# Patient Record
Sex: FEMALE | Race: WHITE | Employment: OTHER | URBAN - METROPOLITAN AREA
[De-identification: names, ages, dates, MRNs, and addresses within clinical notes are randomized per-mention and may not be internally consistent; named-entity substitution may affect disease eponyms.]

---

## 2017-04-20 ENCOUNTER — HOSPITAL ENCOUNTER (OUTPATIENT)
Dept: NUCLEAR MEDICINE | Age: 82
Discharge: HOME | End: 2017-04-20
Payer: MEDICARE

## 2017-04-20 ENCOUNTER — HOSPITAL ENCOUNTER (OUTPATIENT)
Dept: NON INVASIVE DIAGNOSTICS | Age: 82
Discharge: HOME | End: 2017-04-20
Payer: MEDICARE

## 2017-04-20 VITALS
HEART RATE: 72 BPM | WEIGHT: 124 LBS | DIASTOLIC BLOOD PRESSURE: 73 MMHG | HEIGHT: 60 IN | RESPIRATION RATE: 16 BRPM | SYSTOLIC BLOOD PRESSURE: 139 MMHG | BODY MASS INDEX: 24.35 KG/M2

## 2017-04-20 LAB
LV EF: 88 %
LVEF MODALITY: NORMAL

## 2017-04-20 PROCEDURE — A9500 TC99M SESTAMIBI: HCPCS | Performed by: INTERNAL MEDICINE

## 2017-04-20 PROCEDURE — 6360000002 HC RX W HCPCS: Performed by: INTERNAL MEDICINE

## 2017-04-20 PROCEDURE — 3430000000 HC RX DIAGNOSTIC RADIOPHARMACEUTICAL: Performed by: INTERNAL MEDICINE

## 2017-04-20 PROCEDURE — 78452 HT MUSCLE IMAGE SPECT MULT: CPT

## 2017-04-20 PROCEDURE — 93017 CV STRESS TEST TRACING ONLY: CPT

## 2017-04-20 RX ORDER — 0.9 % SODIUM CHLORIDE 0.9 %
250 INTRAVENOUS SOLUTION INTRAVENOUS ONCE
Status: DISCONTINUED | OUTPATIENT
Start: 2017-04-20 | End: 2017-04-21 | Stop reason: HOSPADM

## 2017-04-20 RX ORDER — AMINOPHYLLINE DIHYDRATE 25 MG/ML
100 INJECTION, SOLUTION INTRAVENOUS
Status: ACTIVE | OUTPATIENT
Start: 2017-04-20 | End: 2017-04-20

## 2017-04-20 RX ORDER — NITROGLYCERIN 0.4 MG/1
0.4 TABLET SUBLINGUAL EVERY 5 MIN PRN
Status: DISCONTINUED | OUTPATIENT
Start: 2017-04-20 | End: 2017-04-21 | Stop reason: HOSPADM

## 2017-04-20 RX ORDER — METOPROLOL TARTRATE 5 MG/5ML
2.5 INJECTION INTRAVENOUS PRN
Status: DISCONTINUED | OUTPATIENT
Start: 2017-04-20 | End: 2017-04-21 | Stop reason: HOSPADM

## 2017-04-20 RX ORDER — SODIUM CHLORIDE 0.9 % (FLUSH) 0.9 %
10 SYRINGE (ML) INJECTION PRN
Status: DISCONTINUED | OUTPATIENT
Start: 2017-04-20 | End: 2017-04-21 | Stop reason: HOSPADM

## 2017-04-20 RX ADMIN — TETRAKIS(2-METHOXYISOBUTYLISOCYANIDE)COPPER(I) TETRAFLUOROBORATE 19.4 MILLICURIE: 1 INJECTION, POWDER, LYOPHILIZED, FOR SOLUTION INTRAVENOUS at 08:40

## 2017-04-20 RX ADMIN — TETRAKIS(2-METHOXYISOBUTYLISOCYANIDE)COPPER(I) TETRAFLUOROBORATE 42 MILLICURIE: 1 INJECTION, POWDER, LYOPHILIZED, FOR SOLUTION INTRAVENOUS at 12:00

## 2017-04-20 RX ADMIN — REGADENOSON 0.4 MG: 0.08 INJECTION, SOLUTION INTRAVENOUS at 08:38

## 2017-06-15 ENCOUNTER — HOSPITAL ENCOUNTER (OUTPATIENT)
Age: 82
Setting detail: SPECIMEN
Discharge: HOME | End: 2017-06-15
Payer: MEDICARE

## 2017-06-20 LAB — SURGICAL PATHOLOGY REPORT: NORMAL

## 2019-12-03 ENCOUNTER — HOSPITAL ENCOUNTER (OUTPATIENT)
Dept: ULTRASOUND IMAGING | Age: 84
Discharge: HOME | End: 2019-12-05
Payer: MEDICARE

## 2019-12-03 DIAGNOSIS — E04.2 NONTOXIC MULTINODULAR GOITER: ICD-10-CM

## 2019-12-03 PROCEDURE — 76536 US EXAM OF HEAD AND NECK: CPT

## 2020-07-14 ENCOUNTER — HOSPITAL ENCOUNTER (OUTPATIENT)
Dept: CT IMAGING | Facility: CLINIC | Age: 85
Discharge: HOME | End: 2020-07-16
Payer: MEDICARE

## 2020-07-14 PROCEDURE — 70450 CT HEAD/BRAIN W/O DYE: CPT

## 2023-08-09 ENCOUNTER — HOSPITAL ENCOUNTER (OUTPATIENT)
Age: 88
Discharge: HOME | End: 2023-08-09
Payer: MEDICARE

## 2023-08-09 LAB
BUN SERPL-MCNC: 15 MG/DL (ref 8–23)
CREAT SERPL-MCNC: 0.7 MG/DL (ref 0.5–0.9)
GFR SERPL CREATININE-BSD FRML MDRD: >60 ML/MIN/1.73M2

## 2023-08-09 PROCEDURE — 82565 ASSAY OF CREATININE: CPT

## 2023-08-09 PROCEDURE — 36415 COLL VENOUS BLD VENIPUNCTURE: CPT

## 2023-08-09 PROCEDURE — 84520 ASSAY OF UREA NITROGEN: CPT

## 2023-08-24 ENCOUNTER — HOSPITAL ENCOUNTER (OUTPATIENT)
Dept: CT IMAGING | Age: 88
Discharge: HOME | End: 2023-08-26
Attending: NURSE PRACTITIONER
Payer: MEDICARE

## 2023-08-24 DIAGNOSIS — C18.9 MALIGNANT NEOPLASM OF COLON, UNSPECIFIED PART OF COLON (HCC): ICD-10-CM

## 2023-08-24 LAB — CREAT BLD-MCNC: 0.7 MG/DL (ref 0.6–1.4)

## 2023-08-24 PROCEDURE — 82565 ASSAY OF CREATININE: CPT

## 2023-08-24 PROCEDURE — 2580000003 HC RX 258: Performed by: NURSE PRACTITIONER

## 2023-08-24 PROCEDURE — 6360000004 HC RX CONTRAST MEDICATION: Performed by: NURSE PRACTITIONER

## 2023-08-24 PROCEDURE — 74177 CT ABD & PELVIS W/CONTRAST: CPT

## 2023-08-24 PROCEDURE — 2500000003 HC RX 250 WO HCPCS: Performed by: NURSE PRACTITIONER

## 2023-08-24 RX ORDER — 0.9 % SODIUM CHLORIDE 0.9 %
80 INTRAVENOUS SOLUTION INTRAVENOUS ONCE
Status: COMPLETED | OUTPATIENT
Start: 2023-08-24 | End: 2023-08-24

## 2023-08-24 RX ORDER — SODIUM CHLORIDE 0.9 % (FLUSH) 0.9 %
10 SYRINGE (ML) INJECTION PRN
Status: DISCONTINUED | OUTPATIENT
Start: 2023-08-24 | End: 2023-08-27 | Stop reason: HOSPADM

## 2023-08-24 RX ADMIN — BARIUM SULFATE 450 ML: 20 SUSPENSION ORAL at 13:28

## 2023-08-24 RX ADMIN — SODIUM CHLORIDE, PRESERVATIVE FREE 10 ML: 5 INJECTION INTRAVENOUS at 13:27

## 2023-08-24 RX ADMIN — SODIUM CHLORIDE 80 ML: 9 INJECTION, SOLUTION INTRAVENOUS at 13:27

## 2023-08-24 RX ADMIN — IOPAMIDOL 100 ML: 755 INJECTION, SOLUTION INTRAVENOUS at 13:27

## 2023-08-30 ENCOUNTER — HOSPITAL ENCOUNTER (OUTPATIENT)
Age: 88
Setting detail: SPECIMEN
Discharge: HOME | End: 2023-08-30

## 2023-08-30 LAB
BASOPHILS # BLD: 0.1 K/UL (ref 0–0.2)
BASOPHILS NFR BLD: 1 % (ref 0–2)
EOSINOPHIL # BLD: 0.1 K/UL (ref 0–0.44)
EOSINOPHILS RELATIVE PERCENT: 1 % (ref 1–4)
ERYTHROCYTE [DISTWIDTH] IN BLOOD BY AUTOMATED COUNT: 23.5 % (ref 11.8–14.4)
HCT VFR BLD AUTO: 38.7 % (ref 36.3–47.1)
HGB BLD-MCNC: 11.4 G/DL (ref 11.9–15.1)
IMM GRANULOCYTES # BLD AUTO: 0.1 K/UL (ref 0–0.3)
IMM GRANULOCYTES NFR BLD: 1 %
LYMPHOCYTES # BLD: 16 % (ref 24–43)
LYMPHOCYTES NFR BLD: 1.63 K/UL (ref 1.1–3.7)
MCH RBC QN AUTO: 23.5 PG (ref 25.2–33.5)
MCHC RBC AUTO-ENTMCNC: 29.5 G/DL (ref 28.4–34.8)
MCV RBC AUTO: 79.6 FL (ref 82.6–102.9)
MONOCYTES NFR BLD: 0.92 K/UL (ref 0.1–1.2)
MONOCYTES NFR BLD: 9 % (ref 3–12)
MORPHOLOGY: ABNORMAL
NEUTROPHILS NFR BLD: 72 % (ref 36–65)
NEUTS SEG NFR BLD: 7.35 K/UL (ref 1.5–8.1)
NRBC BLD-RTO: 0 PER 100 WBC
PLATELET # BLD AUTO: 477 K/UL (ref 138–453)
PMV BLD AUTO: 11 FL (ref 8.1–13.5)
RBC # BLD AUTO: 4.86 M/UL (ref 3.95–5.11)
WBC OTHER # BLD: 10.2 K/UL (ref 3.5–11.3)

## 2023-08-30 PROCEDURE — P9603 ONE-WAY ALLOW PRORATED MILES: HCPCS

## 2023-08-30 PROCEDURE — 36415 COLL VENOUS BLD VENIPUNCTURE: CPT

## 2023-08-30 PROCEDURE — 85025 COMPLETE CBC W/AUTO DIFF WBC: CPT

## 2023-09-20 ENCOUNTER — HOSPITAL ENCOUNTER (OUTPATIENT)
Dept: PREADMISSION TESTING | Age: 88
Discharge: HOME | End: 2023-09-20
Payer: MEDICARE

## 2023-09-20 VITALS
OXYGEN SATURATION: 98 % | DIASTOLIC BLOOD PRESSURE: 57 MMHG | TEMPERATURE: 97.7 F | RESPIRATION RATE: 16 BRPM | SYSTOLIC BLOOD PRESSURE: 118 MMHG | BODY MASS INDEX: 24.74 KG/M2 | HEART RATE: 65 BPM | HEIGHT: 60 IN | WEIGHT: 126 LBS

## 2023-09-20 LAB
ABO + RH BLD: NORMAL
ANION GAP SERPL CALCULATED.3IONS-SCNC: 9 MMOL/L (ref 9–17)
ARM BAND NUMBER: NORMAL
BLOOD BANK SAMPLE EXPIRATION: NORMAL
BLOOD GROUP ANTIBODIES SERPL: NEGATIVE
BUN SERPL-MCNC: 16 MG/DL (ref 8–23)
BUN/CREAT SERPL: 18 (ref 9–20)
CALCIUM SERPL-MCNC: 9.7 MG/DL (ref 8.6–10.4)
CEA SERPL-MCNC: 12.7 NG/ML
CHLORIDE SERPL-SCNC: 102 MMOL/L (ref 98–107)
CO2 SERPL-SCNC: 27 MMOL/L (ref 20–31)
CREAT SERPL-MCNC: 0.9 MG/DL (ref 0.5–0.9)
ERYTHROCYTE [DISTWIDTH] IN BLOOD BY AUTOMATED COUNT: 27.4 % (ref 11.8–14.4)
EST. AVERAGE GLUCOSE BLD GHB EST-MCNC: 131 MG/DL
GFR SERPL CREATININE-BSD FRML MDRD: >60 ML/MIN/1.73M2
GLUCOSE SERPL-MCNC: 143 MG/DL (ref 70–99)
HBA1C MFR BLD: 6.2 % (ref 4–6)
HCT VFR BLD AUTO: 33.9 % (ref 36.3–47.1)
HGB BLD-MCNC: 10.1 G/DL (ref 11.9–15.1)
MCH RBC QN AUTO: 25.1 PG (ref 25.2–33.5)
MCHC RBC AUTO-ENTMCNC: 29.8 G/DL (ref 28.4–34.8)
MCV RBC AUTO: 84.1 FL (ref 82.6–102.9)
NRBC BLD-RTO: 0 PER 100 WBC
PLATELET # BLD AUTO: 256 K/UL (ref 138–453)
PMV BLD AUTO: 10.3 FL (ref 8.1–13.5)
POTASSIUM SERPL-SCNC: 4.3 MMOL/L (ref 3.7–5.3)
RBC # BLD AUTO: 4.03 M/UL (ref 3.95–5.11)
SODIUM SERPL-SCNC: 138 MMOL/L (ref 135–144)
WBC OTHER # BLD: 9.6 K/UL (ref 3.5–11.3)

## 2023-09-20 PROCEDURE — 80048 BASIC METABOLIC PNL TOTAL CA: CPT

## 2023-09-20 PROCEDURE — 36415 COLL VENOUS BLD VENIPUNCTURE: CPT

## 2023-09-20 PROCEDURE — 83036 HEMOGLOBIN GLYCOSYLATED A1C: CPT

## 2023-09-20 PROCEDURE — 86850 RBC ANTIBODY SCREEN: CPT

## 2023-09-20 PROCEDURE — 85027 COMPLETE CBC AUTOMATED: CPT

## 2023-09-20 PROCEDURE — 86900 BLOOD TYPING SEROLOGIC ABO: CPT

## 2023-09-20 PROCEDURE — 82378 CARCINOEMBRYONIC ANTIGEN: CPT

## 2023-09-20 PROCEDURE — 86901 BLOOD TYPING SEROLOGIC RH(D): CPT

## 2023-09-20 RX ORDER — TRAMADOL HYDROCHLORIDE 50 MG/1
TABLET ORAL
COMMUNITY
Start: 2023-06-28

## 2023-09-20 RX ORDER — FUROSEMIDE 40 MG/1
40 TABLET ORAL 2 TIMES DAILY
COMMUNITY

## 2023-09-20 RX ORDER — AMLODIPINE BESYLATE 5 MG/1
5 TABLET ORAL DAILY
COMMUNITY

## 2023-09-20 RX ORDER — ATORVASTATIN CALCIUM 10 MG/1
10 TABLET, FILM COATED ORAL DAILY
COMMUNITY

## 2023-09-20 RX ORDER — DENOSUMAB 60 MG/ML
60 INJECTION SUBCUTANEOUS ONCE
COMMUNITY

## 2023-09-20 RX ORDER — VITAMIN B COMPLEX
1 TABLET ORAL DAILY
COMMUNITY

## 2023-09-20 RX ORDER — METRONIDAZOLE 500 MG/100ML
500 INJECTION, SOLUTION INTRAVENOUS EVERY 8 HOURS
OUTPATIENT
Start: 2023-10-04

## 2023-09-20 RX ORDER — LISINOPRIL 20 MG/1
20 TABLET ORAL 2 TIMES DAILY
COMMUNITY

## 2023-09-20 ASSESSMENT — PAIN SCALES - GENERAL: PAINLEVEL_OUTOF10: 0

## 2023-10-03 ENCOUNTER — ANESTHESIA EVENT (OUTPATIENT)
Dept: OPERATING ROOM | Age: 88
End: 2023-10-03
Payer: MEDICARE

## 2023-10-04 ENCOUNTER — HOSPITAL ENCOUNTER (INPATIENT)
Age: 88
LOS: 5 days | Discharge: NURSING, REHAB OR LONG-TERM CARE | DRG: 331 | End: 2023-10-09
Attending: SURGERY | Admitting: SURGERY
Payer: MEDICARE

## 2023-10-04 ENCOUNTER — ANESTHESIA (OUTPATIENT)
Dept: OPERATING ROOM | Age: 88
End: 2023-10-04
Payer: MEDICARE

## 2023-10-04 DIAGNOSIS — G89.18 ACUTE POSTOPERATIVE PAIN: Primary | ICD-10-CM

## 2023-10-04 DIAGNOSIS — K63.89 COLONIC MASS: ICD-10-CM

## 2023-10-04 LAB
ABO + RH BLD: NORMAL
ARM BAND NUMBER: NORMAL
BLOOD BANK SAMPLE EXPIRATION: NORMAL
BLOOD GROUP ANTIBODIES SERPL: NEGATIVE
GLUCOSE BLD-MCNC: 126 MG/DL (ref 65–105)
GLUCOSE BLD-MCNC: 137 MG/DL (ref 65–105)

## 2023-10-04 PROCEDURE — 6370000000 HC RX 637 (ALT 250 FOR IP): Performed by: HOSPITALIST

## 2023-10-04 PROCEDURE — 3600000002 HC SURGERY LEVEL 2 BASE: Performed by: SURGERY

## 2023-10-04 PROCEDURE — 0DRU0JZ REPLACEMENT OF OMENTUM WITH SYNTHETIC SUBSTITUTE, OPEN APPROACH: ICD-10-PCS | Performed by: SURGERY

## 2023-10-04 PROCEDURE — 2709999900 HC NON-CHARGEABLE SUPPLY: Performed by: SURGERY

## 2023-10-04 PROCEDURE — A4216 STERILE WATER/SALINE, 10 ML: HCPCS | Performed by: SURGERY

## 2023-10-04 PROCEDURE — 86901 BLOOD TYPING SEROLOGIC RH(D): CPT

## 2023-10-04 PROCEDURE — 86900 BLOOD TYPING SEROLOGIC ABO: CPT

## 2023-10-04 PROCEDURE — 3E0T3BZ INTRODUCTION OF ANESTHETIC AGENT INTO PERIPHERAL NERVES AND PLEXI, PERCUTANEOUS APPROACH: ICD-10-PCS

## 2023-10-04 PROCEDURE — 0DTF0ZZ RESECTION OF RIGHT LARGE INTESTINE, OPEN APPROACH: ICD-10-PCS | Performed by: SURGERY

## 2023-10-04 PROCEDURE — 2720000010 HC SURG SUPPLY STERILE: Performed by: SURGERY

## 2023-10-04 PROCEDURE — 7100000001 HC PACU RECOVERY - ADDTL 15 MIN: Performed by: SURGERY

## 2023-10-04 PROCEDURE — 6360000002 HC RX W HCPCS: Performed by: SURGERY

## 2023-10-04 PROCEDURE — 88342 IMHCHEM/IMCYTCHM 1ST ANTB: CPT

## 2023-10-04 PROCEDURE — 3700000001 HC ADD 15 MINUTES (ANESTHESIA): Performed by: SURGERY

## 2023-10-04 PROCEDURE — 36415 COLL VENOUS BLD VENIPUNCTURE: CPT

## 2023-10-04 PROCEDURE — 6360000002 HC RX W HCPCS: Performed by: ANESTHESIOLOGY

## 2023-10-04 PROCEDURE — 2060000000 HC ICU INTERMEDIATE R&B

## 2023-10-04 PROCEDURE — 3600000012 HC SURGERY LEVEL 2 ADDTL 15MIN: Performed by: SURGERY

## 2023-10-04 PROCEDURE — 6360000002 HC RX W HCPCS: Performed by: NURSE ANESTHETIST, CERTIFIED REGISTERED

## 2023-10-04 PROCEDURE — 3700000000 HC ANESTHESIA ATTENDED CARE: Performed by: SURGERY

## 2023-10-04 PROCEDURE — 88341 IMHCHEM/IMCYTCHM EA ADD ANTB: CPT

## 2023-10-04 PROCEDURE — 2580000003 HC RX 258: Performed by: ANESTHESIOLOGY

## 2023-10-04 PROCEDURE — 82947 ASSAY GLUCOSE BLOOD QUANT: CPT

## 2023-10-04 PROCEDURE — 7100000000 HC PACU RECOVERY - FIRST 15 MIN: Performed by: SURGERY

## 2023-10-04 PROCEDURE — 2500000003 HC RX 250 WO HCPCS: Performed by: SURGERY

## 2023-10-04 PROCEDURE — 88307 TISSUE EXAM BY PATHOLOGIST: CPT

## 2023-10-04 PROCEDURE — 86850 RBC ANTIBODY SCREEN: CPT

## 2023-10-04 PROCEDURE — 2580000003 HC RX 258: Performed by: SURGERY

## 2023-10-04 PROCEDURE — 1200000000 HC SEMI PRIVATE

## 2023-10-04 PROCEDURE — 0DTF0ZZ RESECTION OF RIGHT LARGE INTESTINE, OPEN APPROACH: ICD-10-PCS

## 2023-10-04 PROCEDURE — 2500000003 HC RX 250 WO HCPCS: Performed by: NURSE ANESTHETIST, CERTIFIED REGISTERED

## 2023-10-04 PROCEDURE — P9041 ALBUMIN (HUMAN),5%, 50ML: HCPCS | Performed by: NURSE ANESTHETIST, CERTIFIED REGISTERED

## 2023-10-04 RX ORDER — PHENYLEPHRINE HCL IN 0.9% NACL 1 MG/10 ML
SYRINGE (ML) INTRAVENOUS PRN
Status: DISCONTINUED | OUTPATIENT
Start: 2023-10-04 | End: 2023-10-04 | Stop reason: SDUPTHER

## 2023-10-04 RX ORDER — POTASSIUM CHLORIDE 20 MEQ/1
40 TABLET, EXTENDED RELEASE ORAL PRN
Status: DISCONTINUED | OUTPATIENT
Start: 2023-10-04 | End: 2023-10-09 | Stop reason: HOSPADM

## 2023-10-04 RX ORDER — ATORVASTATIN CALCIUM 10 MG/1
10 TABLET, FILM COATED ORAL DAILY
Status: DISCONTINUED | OUTPATIENT
Start: 2023-10-04 | End: 2023-10-09 | Stop reason: HOSPADM

## 2023-10-04 RX ORDER — MORPHINE SULFATE 2 MG/ML
2 INJECTION, SOLUTION INTRAMUSCULAR; INTRAVENOUS
Status: DISCONTINUED | OUTPATIENT
Start: 2023-10-04 | End: 2023-10-07

## 2023-10-04 RX ORDER — LIDOCAINE HYDROCHLORIDE 10 MG/ML
1 INJECTION, SOLUTION EPIDURAL; INFILTRATION; INTRACAUDAL; PERINEURAL
Status: DISCONTINUED | OUTPATIENT
Start: 2023-10-05 | End: 2023-10-04 | Stop reason: HOSPADM

## 2023-10-04 RX ORDER — MORPHINE SULFATE 2 MG/ML
2 INJECTION, SOLUTION INTRAMUSCULAR; INTRAVENOUS EVERY 4 HOURS PRN
Status: DISCONTINUED | OUTPATIENT
Start: 2023-10-04 | End: 2023-10-07

## 2023-10-04 RX ORDER — FENTANYL CITRATE 50 UG/ML
25 INJECTION, SOLUTION INTRAMUSCULAR; INTRAVENOUS EVERY 5 MIN PRN
Status: DISCONTINUED | OUTPATIENT
Start: 2023-10-04 | End: 2023-10-04 | Stop reason: HOSPADM

## 2023-10-04 RX ORDER — SODIUM CHLORIDE 9 MG/ML
INJECTION, SOLUTION INTRAVENOUS CONTINUOUS
Status: DISCONTINUED | OUTPATIENT
Start: 2023-10-04 | End: 2023-10-04

## 2023-10-04 RX ORDER — FAMOTIDINE 20 MG/1
20 TABLET, FILM COATED ORAL DAILY
Status: DISCONTINUED | OUTPATIENT
Start: 2023-10-04 | End: 2023-10-09 | Stop reason: HOSPADM

## 2023-10-04 RX ORDER — BUPIVACAINE HYDROCHLORIDE AND EPINEPHRINE 5; 5 MG/ML; UG/ML
INJECTION, SOLUTION EPIDURAL; INTRACAUDAL; PERINEURAL PRN
Status: DISCONTINUED | OUTPATIENT
Start: 2023-10-04 | End: 2023-10-04 | Stop reason: ALTCHOICE

## 2023-10-04 RX ORDER — DEXAMETHASONE SODIUM PHOSPHATE 10 MG/ML
INJECTION, SOLUTION INTRAMUSCULAR; INTRAVENOUS PRN
Status: DISCONTINUED | OUTPATIENT
Start: 2023-10-04 | End: 2023-10-04 | Stop reason: SDUPTHER

## 2023-10-04 RX ORDER — DEXTROSE AND SODIUM CHLORIDE 5; .45 G/100ML; G/100ML
INJECTION, SOLUTION INTRAVENOUS CONTINUOUS
Status: DISCONTINUED | OUTPATIENT
Start: 2023-10-04 | End: 2023-10-07

## 2023-10-04 RX ORDER — FUROSEMIDE 40 MG/1
40 TABLET ORAL 2 TIMES DAILY
Status: DISCONTINUED | OUTPATIENT
Start: 2023-10-04 | End: 2023-10-06

## 2023-10-04 RX ORDER — PROPOFOL 10 MG/ML
INJECTION, EMULSION INTRAVENOUS PRN
Status: DISCONTINUED | OUTPATIENT
Start: 2023-10-04 | End: 2023-10-04 | Stop reason: SDUPTHER

## 2023-10-04 RX ORDER — SODIUM CHLORIDE 0.9 % (FLUSH) 0.9 %
5-40 SYRINGE (ML) INJECTION PRN
Status: DISCONTINUED | OUTPATIENT
Start: 2023-10-04 | End: 2023-10-09 | Stop reason: HOSPADM

## 2023-10-04 RX ORDER — LIDOCAINE HYDROCHLORIDE 20 MG/ML
INJECTION, SOLUTION EPIDURAL; INFILTRATION; INTRACAUDAL; PERINEURAL PRN
Status: DISCONTINUED | OUTPATIENT
Start: 2023-10-04 | End: 2023-10-04 | Stop reason: SDUPTHER

## 2023-10-04 RX ORDER — MAGNESIUM SULFATE IN WATER 40 MG/ML
2000 INJECTION, SOLUTION INTRAVENOUS PRN
Status: DISCONTINUED | OUTPATIENT
Start: 2023-10-04 | End: 2023-10-09 | Stop reason: HOSPADM

## 2023-10-04 RX ORDER — SODIUM CHLORIDE, SODIUM LACTATE, POTASSIUM CHLORIDE, CALCIUM CHLORIDE 600; 310; 30; 20 MG/100ML; MG/100ML; MG/100ML; MG/100ML
INJECTION, SOLUTION INTRAVENOUS CONTINUOUS
Status: DISCONTINUED | OUTPATIENT
Start: 2023-10-04 | End: 2023-10-04

## 2023-10-04 RX ORDER — SODIUM CHLORIDE 0.9 % (FLUSH) 0.9 %
5-40 SYRINGE (ML) INJECTION EVERY 12 HOURS SCHEDULED
Status: DISCONTINUED | OUTPATIENT
Start: 2023-10-04 | End: 2023-10-09 | Stop reason: HOSPADM

## 2023-10-04 RX ORDER — ONDANSETRON 2 MG/ML
4 INJECTION INTRAMUSCULAR; INTRAVENOUS
Status: DISCONTINUED | OUTPATIENT
Start: 2023-10-04 | End: 2023-10-04 | Stop reason: HOSPADM

## 2023-10-04 RX ORDER — FENTANYL CITRATE 50 UG/ML
50 INJECTION, SOLUTION INTRAMUSCULAR; INTRAVENOUS EVERY 10 MIN PRN
Status: DISCONTINUED | OUTPATIENT
Start: 2023-10-04 | End: 2023-10-04 | Stop reason: HOSPADM

## 2023-10-04 RX ORDER — OXYCODONE HYDROCHLORIDE 5 MG/1
2.5 TABLET ORAL EVERY 6 HOURS PRN
Status: DISCONTINUED | OUTPATIENT
Start: 2023-10-04 | End: 2023-10-07

## 2023-10-04 RX ORDER — SODIUM CHLORIDE 0.9 % (FLUSH) 0.9 %
5-40 SYRINGE (ML) INJECTION PRN
Status: DISCONTINUED | OUTPATIENT
Start: 2023-10-04 | End: 2023-10-04 | Stop reason: HOSPADM

## 2023-10-04 RX ORDER — ENOXAPARIN SODIUM 100 MG/ML
30 INJECTION SUBCUTANEOUS DAILY
Status: DISCONTINUED | OUTPATIENT
Start: 2023-10-05 | End: 2023-10-08

## 2023-10-04 RX ORDER — SODIUM CHLORIDE 9 MG/ML
INJECTION, SOLUTION INTRAVENOUS PRN
Status: DISCONTINUED | OUTPATIENT
Start: 2023-10-04 | End: 2023-10-04 | Stop reason: HOSPADM

## 2023-10-04 RX ORDER — ALBUMIN, HUMAN INJ 5% 5 %
SOLUTION INTRAVENOUS PRN
Status: DISCONTINUED | OUTPATIENT
Start: 2023-10-04 | End: 2023-10-04 | Stop reason: SDUPTHER

## 2023-10-04 RX ORDER — POTASSIUM CHLORIDE 20 MEQ/1
40 TABLET, EXTENDED RELEASE ORAL PRN
Status: DISCONTINUED | OUTPATIENT
Start: 2023-10-04 | End: 2023-10-04 | Stop reason: SDUPTHER

## 2023-10-04 RX ORDER — ROCURONIUM BROMIDE 10 MG/ML
INJECTION, SOLUTION INTRAVENOUS PRN
Status: DISCONTINUED | OUTPATIENT
Start: 2023-10-04 | End: 2023-10-04 | Stop reason: SDUPTHER

## 2023-10-04 RX ORDER — POTASSIUM CHLORIDE 7.45 MG/ML
10 INJECTION INTRAVENOUS PRN
Status: DISCONTINUED | OUTPATIENT
Start: 2023-10-04 | End: 2023-10-04 | Stop reason: SDUPTHER

## 2023-10-04 RX ORDER — ONDANSETRON 2 MG/ML
4 INJECTION INTRAMUSCULAR; INTRAVENOUS EVERY 6 HOURS PRN
Status: DISCONTINUED | OUTPATIENT
Start: 2023-10-04 | End: 2023-10-09 | Stop reason: HOSPADM

## 2023-10-04 RX ORDER — METRONIDAZOLE 500 MG/100ML
500 INJECTION, SOLUTION INTRAVENOUS ONCE
Status: COMPLETED | OUTPATIENT
Start: 2023-10-04 | End: 2023-10-04

## 2023-10-04 RX ORDER — SODIUM CHLORIDE 0.9 % (FLUSH) 0.9 %
5-40 SYRINGE (ML) INJECTION EVERY 12 HOURS SCHEDULED
Status: DISCONTINUED | OUTPATIENT
Start: 2023-10-04 | End: 2023-10-04 | Stop reason: HOSPADM

## 2023-10-04 RX ORDER — LISINOPRIL 20 MG/1
20 TABLET ORAL 2 TIMES DAILY
Status: DISCONTINUED | OUTPATIENT
Start: 2023-10-04 | End: 2023-10-09 | Stop reason: HOSPADM

## 2023-10-04 RX ORDER — SODIUM CHLORIDE 9 MG/ML
INJECTION, SOLUTION INTRAVENOUS PRN
Status: DISCONTINUED | OUTPATIENT
Start: 2023-10-04 | End: 2023-10-09 | Stop reason: HOSPADM

## 2023-10-04 RX ORDER — AMLODIPINE BESYLATE 5 MG/1
5 TABLET ORAL DAILY
Status: DISCONTINUED | OUTPATIENT
Start: 2023-10-04 | End: 2023-10-09 | Stop reason: HOSPADM

## 2023-10-04 RX ORDER — FENTANYL CITRATE 50 UG/ML
INJECTION, SOLUTION INTRAMUSCULAR; INTRAVENOUS PRN
Status: DISCONTINUED | OUTPATIENT
Start: 2023-10-04 | End: 2023-10-04 | Stop reason: SDUPTHER

## 2023-10-04 RX ORDER — POTASSIUM CHLORIDE 7.45 MG/ML
10 INJECTION INTRAVENOUS PRN
Status: DISCONTINUED | OUTPATIENT
Start: 2023-10-04 | End: 2023-10-09 | Stop reason: HOSPADM

## 2023-10-04 RX ORDER — ONDANSETRON 2 MG/ML
INJECTION INTRAMUSCULAR; INTRAVENOUS PRN
Status: DISCONTINUED | OUTPATIENT
Start: 2023-10-04 | End: 2023-10-04 | Stop reason: SDUPTHER

## 2023-10-04 RX ORDER — OXYCODONE HYDROCHLORIDE 5 MG/1
5 TABLET ORAL
Status: DISCONTINUED | OUTPATIENT
Start: 2023-10-04 | End: 2023-10-04 | Stop reason: HOSPADM

## 2023-10-04 RX ADMIN — LISINOPRIL 20 MG: 20 TABLET ORAL at 20:18

## 2023-10-04 RX ADMIN — PROPOFOL 100 MG: 10 INJECTION, EMULSION INTRAVENOUS at 07:35

## 2023-10-04 RX ADMIN — LIDOCAINE HYDROCHLORIDE 50 MG: 20 INJECTION, SOLUTION EPIDURAL; INFILTRATION; INTRACAUDAL; PERINEURAL at 07:35

## 2023-10-04 RX ADMIN — METOPROLOL TARTRATE 25 MG: 25 TABLET, FILM COATED ORAL at 20:18

## 2023-10-04 RX ADMIN — ROCURONIUM BROMIDE 50 MG: 10 INJECTION, SOLUTION INTRAVENOUS at 07:35

## 2023-10-04 RX ADMIN — METOPROLOL TARTRATE 25 MG: 25 TABLET, FILM COATED ORAL at 15:06

## 2023-10-04 RX ADMIN — Medication 200 MCG: at 08:15

## 2023-10-04 RX ADMIN — METRONIDAZOLE 500 MG: 500 SOLUTION INTRAVENOUS at 07:44

## 2023-10-04 RX ADMIN — MORPHINE SULFATE 2 MG: 2 INJECTION, SOLUTION INTRAMUSCULAR; INTRAVENOUS at 15:07

## 2023-10-04 RX ADMIN — FENTANYL CITRATE 25 MCG: 50 INJECTION INTRAMUSCULAR; INTRAVENOUS at 09:50

## 2023-10-04 RX ADMIN — DEXAMETHASONE SODIUM PHOSPHATE 4 MG: 10 INJECTION, SOLUTION INTRAMUSCULAR; INTRAVENOUS at 07:52

## 2023-10-04 RX ADMIN — FAMOTIDINE 20 MG: 10 INJECTION, SOLUTION INTRAVENOUS at 20:18

## 2023-10-04 RX ADMIN — Medication 100 MCG: at 07:45

## 2023-10-04 RX ADMIN — Medication 100 MCG: at 07:41

## 2023-10-04 RX ADMIN — AMLODIPINE BESYLATE 5 MG: 5 TABLET ORAL at 15:06

## 2023-10-04 RX ADMIN — LISINOPRIL 20 MG: 20 TABLET ORAL at 15:06

## 2023-10-04 RX ADMIN — Medication 100 MCG: at 08:07

## 2023-10-04 RX ADMIN — DEXTROSE AND SODIUM CHLORIDE: 5; 450 INJECTION, SOLUTION INTRAVENOUS at 13:09

## 2023-10-04 RX ADMIN — SUGAMMADEX 200 MG: 100 INJECTION, SOLUTION INTRAVENOUS at 08:50

## 2023-10-04 RX ADMIN — MORPHINE SULFATE 2 MG: 2 INJECTION, SOLUTION INTRAMUSCULAR; INTRAVENOUS at 20:22

## 2023-10-04 RX ADMIN — SODIUM CHLORIDE, PRESERVATIVE FREE 10 ML: 5 INJECTION INTRAVENOUS at 20:24

## 2023-10-04 RX ADMIN — SODIUM CHLORIDE, POTASSIUM CHLORIDE, SODIUM LACTATE AND CALCIUM CHLORIDE: 600; 310; 30; 20 INJECTION, SOLUTION INTRAVENOUS at 07:29

## 2023-10-04 RX ADMIN — ONDANSETRON 4 MG: 2 INJECTION INTRAMUSCULAR; INTRAVENOUS at 08:29

## 2023-10-04 RX ADMIN — FENTANYL CITRATE 50 MCG: 50 INJECTION INTRAMUSCULAR; INTRAVENOUS at 07:35

## 2023-10-04 RX ADMIN — Medication 2000 MG: at 06:56

## 2023-10-04 RX ADMIN — MORPHINE SULFATE 2 MG: 2 INJECTION, SOLUTION INTRAMUSCULAR; INTRAVENOUS at 23:01

## 2023-10-04 RX ADMIN — FUROSEMIDE 40 MG: 40 TABLET ORAL at 15:06

## 2023-10-04 RX ADMIN — Medication 100 MCG: at 08:10

## 2023-10-04 RX ADMIN — Medication 100 MCG: at 07:52

## 2023-10-04 RX ADMIN — Medication 100 MCG: at 07:36

## 2023-10-04 RX ADMIN — FENTANYL CITRATE 25 MCG: 50 INJECTION INTRAMUSCULAR; INTRAVENOUS at 11:48

## 2023-10-04 RX ADMIN — FENTANYL CITRATE 25 MCG: 50 INJECTION INTRAMUSCULAR; INTRAVENOUS at 10:27

## 2023-10-04 RX ADMIN — ALBUMIN (HUMAN) 25 G: 12.5 INJECTION, SOLUTION INTRAVENOUS at 07:46

## 2023-10-04 RX ADMIN — ATORVASTATIN CALCIUM 10 MG: 10 TABLET, FILM COATED ORAL at 15:06

## 2023-10-04 RX ADMIN — SODIUM CHLORIDE, POTASSIUM CHLORIDE, SODIUM LACTATE AND CALCIUM CHLORIDE: 600; 310; 30; 20 INJECTION, SOLUTION INTRAVENOUS at 06:53

## 2023-10-04 ASSESSMENT — PAIN DESCRIPTION - ORIENTATION
ORIENTATION: MID
ORIENTATION: MID

## 2023-10-04 ASSESSMENT — PAIN DESCRIPTION - DESCRIPTORS
DESCRIPTORS: ACHING
DESCRIPTORS: ACHING;SORE

## 2023-10-04 ASSESSMENT — PAIN DESCRIPTION - PAIN TYPE: TYPE: SURGICAL PAIN;CHRONIC PAIN

## 2023-10-04 ASSESSMENT — PAIN DESCRIPTION - LOCATION
LOCATION: ABDOMEN
LOCATION: ABDOMEN;NECK
LOCATION: ABDOMEN

## 2023-10-04 ASSESSMENT — PAIN SCALES - GENERAL
PAINLEVEL_OUTOF10: 7
PAINLEVEL_OUTOF10: 4
PAINLEVEL_OUTOF10: 5
PAINLEVEL_OUTOF10: 5
PAINLEVEL_OUTOF10: 7
PAINLEVEL_OUTOF10: 7
PAINLEVEL_OUTOF10: 5

## 2023-10-04 ASSESSMENT — PAIN - FUNCTIONAL ASSESSMENT: PAIN_FUNCTIONAL_ASSESSMENT: 0-10

## 2023-10-04 ASSESSMENT — ENCOUNTER SYMPTOMS: SHORTNESS OF BREATH: 0

## 2023-10-04 NOTE — OP NOTE
Operative Note      Patient: Sam Ramsey  YOB: 1935  MRN: [id number]    Date of Procedure: 10/4/2023    Pre-Op Diagnosis Codes:     * Colonic mass [K63.89]    Post-Op Diagnosis:  Ascending colon mass, intra-abdominal lesion       Procedure(s):  OPEN RIGHT HEMICOLECTOMY WITH MOBILIZATION OF HEPATIC FLEXURE, LYSIS OF ADHESIONS, TAP BLOCK, omentopexy    Surgeon(s):  Yessica Perez MD    Assistant:   First Assistant: Cassandra Lauren    Anesthesia: General    Estimated Blood Loss (mL): 25    Complications: None    Specimens:   ID Type Source Tests Collected by Time Destination   A : RIGHT COLON Tissue Colon SURGICAL PATHOLOGY Yessica Perez MD 10/4/2023 0818        Implants:  * No implants in log *      Drains: * No LDAs found *    Findings: Ascending colon    Colon Resection  Operation performed with curative intent Yes   Tumor Location (select all that apply) Ascending colon   Extent of colon and vascular resection (select all that apply) Right hemicolectomy - ileocolic, right colic (if present)          Detailed Description of Procedure:   Patient was brought to the operative room and was placed in the supine position. After induction of general endotracheal incision, patient's abdomen was prepped and draped in usual sterile fashion. A midline incision was made starting from the mid abdomen to just above the umbilicus. Incision was taken down to the fascia which was opened. The fascia was opened full length. The mass in question was noted in the mid ascending colon. Incision was extended cephalad. After opening the incision, a wound protector was placed. Abdominal adhesions in the lower abdomen were encountered where the small bowel was adhered to the pelvis. These were carefully freed following this, examination revealed no liver lesions. No evidence of any metastatic disease noted to the area of abnormality was noted to be in the mid ascending colon. A annular mass could be palpated.   Next by using

## 2023-10-04 NOTE — PROGRESS NOTES
Physical Therapy  DATE: 10/4/2023    NAME: Doroteo Major  MRN: [id number]   : 1935    Patient not seen this date for Physical Therapy due to:      [] Cancel by RN or physician due to:    [] Hemodialysis    [] Critical Lab Value Level     [] Blood transfusion in progress    [] Acute or unstable cardiovascular status   _MAP < 55 or more than >115  _HR < 40 or > 130    [] Acute or unstable pulmonary status   -FiO2 > 60%   _RR < 5 or >40    _O2 sats < 85%    [] Strict Bedrest    [x] Off Unit for surgery for open R hemicolectomy, N/A for PT eval until 10/5    [] Off Unit for testing       [] Pending imaging to R/O fracture    [] Refusal by Patient      [] Other      [] PT being discontinued at this time. Patient independent. No further needs. [] PT being discontinued at this time as the patient has been transferred to hospice care. No further needs.       [address], PT

## 2023-10-04 NOTE — ANESTHESIA POSTPROCEDURE EVALUATION
Department of Anesthesiology  Postprocedure Note    Patient: Maggi Melton  MRN: [id number]  YOB: 1935  Date of evaluation: 10/4/2023      Procedure Summary     Date: 10/04/23 Room / Location: [address] - INPATIENT    Anesthesia Start: 0725 Anesthesia Stop: 3227    Procedure: OPEN RIGHT HEMICOLECTOMY WITH MOBILIZATION OF HEPATIC FLEXURE, LYSIS OF ADHESIONS, TAP BLOCK (Right: Abdomen) Diagnosis:       Colonic mass      (Colonic mass [K63.89])    Surgeons: John Graves MD Responsible Provider: Parisa Baugh MD    Anesthesia Type: general ASA Status: 3          Anesthesia Type: No value filed.     Lizzy Phase I: Lizzy Score: 9    Lizzy Phase II:        Anesthesia Post Evaluation    Complications: no

## 2023-10-04 NOTE — CONSULTS
hypertension, hyperlipidemia, osteoporosis. Denies any previous cardiac problem, vascular surgery. She has have osteoporosis and osteoarthritis. Patient is a former smoker and quit smoking in 1989. Son was present at the bedside. They confirmed that patient does not want any chest compression, CPR, cardioversion, intubation. I have personally reviewed the past medical history, past surgical history, medications, social history, and family history, and summarized in the note. Review of Systems:     All 10 point system is reviewed and negative otherwise mentioned in HPI. 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    DDD (degenerative disc disease), lumbar     Diabetes mellitus ([address])     no medication    History of blood transfusion     Hyperlipidemia     Hypertension     Scoliosis     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    TIA (transient ischemic attack)     over 3 years ago from 2023    [facility] as ambulation aid         Past Surgical History:     Past Surgical History:   Procedure Laterality Date    ARM SURGERY Right     CATARACT REMOVAL      CHOLECYSTECTOMY      EYE SURGERY      FEMUR FRACTURE SURGERY Left     HIP FRACTURE SURGERY Right     JOINT REPLACEMENT Bilateral     knees    TONSILLECTOMY          Medications Prior to Admission:       Prior to Admission medications    Medication Sig Start Date End Date Taking?  Authorizing Provider   lisinopril (PRINIVIL;ZESTRIL) 20 MG tablet Take 1 tablet by mouth in the morning and at bedtime    Libra Mata MD   metoprolol tartrate (LOPRESSOR) 25 MG tablet Take 1 tablet by mouth 2 times daily    Libra Mata MD   Coenzyme Q10 (COQ10) 100 MG CAPS Take 1 tablet by mouth daily    Libra Mata MD   amLODIPine (NORVASC) 5 MG tablet Take 1 tablet by mouth daily    iLbra Mata MD   atorvastatin (LIPITOR) 10 MG tablet Take 1 disorder noted, cranial nerves II through XII grossly intact  Extremities - peripheral pulses palpable, no pedal edema or calf pain with palpation  Skin - no gross lesions, rashes, or induration noted        Data:     Labs:    Hematology:No results for input(s): \"WBC\", \"RBC\", \"HGB\", \"HCT\", \"MCV\", \"MCH\", \"MCHC\", \"RDW\", \"PLT\", \"MPV\", \"SEDRATE\", \"CRP\", \"INR\", \"DDIMER\", \"CN7JLRZK\", \"LABABSO\" in the last 72 hours. Invalid input(s): \"PT\"  Chemistry:No results for input(s): \"NA\", \"K\", \"CL\", \"CO2\", \"GLUCOSE\", \"BUN\", \"CREATININE\", \"MG\", \"ANIONGAP\", \"LABGLOM\", \"GFRAA\", \"CALCIUM\", \"CAION\", \"PHOS\", \"PSA\", \"PROBNP\", \"TROPHS\", \"CKTOTAL\", \"CKMB\", \"CKMBINDEX\", \"MYOGLOBIN\", \"DIGOXIN\", \"LACTACIDWB\" in the last 72 hours. Recent Labs     10/04/23  0637 10/04/23  0912   POCGLU 137* 126*       No results found for: \"INR\", \"PROTIME\"    No results found for: \"SPECIAL\"  No results found for: \"CULTURE\"    No results found for: \"POCPH\", \"PHART\", \"PH\", \"POCPCO2\", \"VZF4CII\", \"PCO2\", \"POCPO2\", \"PO2ART\", \"PO2\", \"POCHCO3\", \"URQ4KHQ\", \"HCO3\", \"NBEA\", \"PBEA\", \"BEART\", \"BE\", \"THGBART\", \"THB\", \"YNP1HWL\", \"EPOM4YIJ\", \"L5PACBFQ\", \"O2SAT\", \"FIO2\"    Radiology:    No results found. All radiological studies reviewed                Code Status:  Limited    Electronically signed by Susan Sargent MD on 10/4/2023 at 12:20 PM     Copy sent to Dr. Park Ballesteros MD    This note was created with the assistance of a speech-recognition program.  Although the intention is to generate a document that actually reflects the content of the visit, no guarantees can be provided that every mistake has been identified and corrected by editing. Note was updated later by me after  physical examination and  completion of the assessment.

## 2023-10-04 NOTE — ANESTHESIA PRE PROCEDURE
Applicable): No results found for: \"COVID19\"        Anesthesia Evaluation    Airway: Mallampati: I  TM distance: >3 FB   Neck ROM: limited  Mouth opening: > = 3 FB   Dental:          Pulmonary:       (-) shortness of breath                           Cardiovascular:    (+) hypertension:,     (-)  angina            Stress test reviewed                Neuro/Psych:   (+) TIA,             GI/Hepatic/Renal:             Endo/Other:    (+) Diabetes, . Abdominal:             Vascular:           Other Findings:           Anesthesia Plan      general     ASA 3                                   Lor Enriquez MD   10/4/2023

## 2023-10-04 NOTE — H&P
Interval H&P Note    Pt Name: Reena Chiang  MRN: [id number]  YOB: 1935  Date of evaluation: 10/4/2023      [x] I have reviewed the hardcopy Surgery Progress Note by Dr Clare Almanzar dated 9/8/23 labeled in short chart for the Interval History and Physical note. [x] I have examined  Reena Chiang, a 81 y.o. female. There are no changes to the patient who is scheduled for OPEN RIGHT HEMICOLECTOMY by Clare Almanzar MD for Colonic mass. The patient denies new health changes, fever, chills, wheezing, cough, increased SOB, chest pain, open sores or wounds. Per PAT Note dated 9/20/23 by HAVEN Alexander CNP Patient with known HTN, HLD and DM \"currently resides at [facility] assisted living. She is present with her nursing aid Gage Hernandez. Patient denies shortness of breath with activity, chest pain, palpitations, dizziness. Patient received PRBC per primary care in August 2023. Patient follows with [facility] and evaluated in 5/2023. \"    NOTE: Patient has decreased ROM in her right shoulder(\"bone on bone\") and asks that staff is cautious when moving her on transfers. 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    DDD (degenerative disc disease), lumbar     Diabetes mellitus ([address])     no medication    History of blood transfusion     Hyperlipidemia     Hypertension     Scoliosis     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    TIA (transient ischemic attack)     over 3 years ago from 2023    [employer] [employer] as ambulation aid         Past Surgical History:     Past Surgical History:   Procedure Laterality Date    ARM SURGERY Right     CATARACT REMOVAL      CHOLECYSTECTOMY      EYE SURGERY      FEMUR FRACTURE SURGERY Left     HIP FRACTURE SURGERY Right     JOINT REPLACEMENT Bilateral     knees    TONSILLECTOMY          Social History:     Tobacco:    reports that she quit smoking about 34 years ago.  Her smoking use included

## 2023-10-05 LAB
ANION GAP SERPL CALCULATED.3IONS-SCNC: 11 MMOL/L (ref 9–17)
BASOPHILS # BLD: 0 K/UL (ref 0–0.2)
BASOPHILS NFR BLD: 0 %
BUN SERPL-MCNC: 8 MG/DL (ref 8–23)
BUN/CREAT SERPL: 10 (ref 9–20)
CALCIUM SERPL-MCNC: 7.7 MG/DL (ref 8.6–10.4)
CHLORIDE SERPL-SCNC: 104 MMOL/L (ref 98–107)
CO2 SERPL-SCNC: 24 MMOL/L (ref 20–31)
CREAT SERPL-MCNC: 0.8 MG/DL (ref 0.5–0.9)
EOSINOPHIL # BLD: 0 K/UL (ref 0–0.4)
EOSINOPHILS RELATIVE PERCENT: 0 % (ref 1–4)
ERYTHROCYTE [DISTWIDTH] IN BLOOD BY AUTOMATED COUNT: 25.2 % (ref 11.8–14.4)
GFR SERPL CREATININE-BSD FRML MDRD: >60 ML/MIN/1.73M2
GLUCOSE SERPL-MCNC: 133 MG/DL (ref 70–99)
HCT VFR BLD AUTO: 29.7 % (ref 36.3–47.1)
HGB BLD-MCNC: 9.1 G/DL (ref 11.9–15.1)
IMM GRANULOCYTES # BLD AUTO: 0 K/UL (ref 0–0.3)
IMM GRANULOCYTES NFR BLD: 0 %
LYMPHOCYTES NFR BLD: 1.04 K/UL (ref 1–4.8)
LYMPHOCYTES RELATIVE PERCENT: 9 % (ref 24–44)
MAGNESIUM SERPL-MCNC: 1.7 MG/DL (ref 1.6–2.6)
MCH RBC QN AUTO: 25.6 PG (ref 25.2–33.5)
MCHC RBC AUTO-ENTMCNC: 30.6 G/DL (ref 28.4–34.8)
MCV RBC AUTO: 83.4 FL (ref 82.6–102.9)
MONOCYTES NFR BLD: 1.15 K/UL (ref 0.2–0.8)
MONOCYTES NFR BLD: 10 % (ref 1–7)
MORPHOLOGY: ABNORMAL
NEUTROPHILS NFR BLD: 81 % (ref 36–66)
NEUTS SEG NFR BLD: 9.31 K/UL (ref 1.8–7.7)
NRBC BLD-RTO: 0 PER 100 WBC
PLATELET # BLD AUTO: 297 K/UL (ref 138–453)
PMV BLD AUTO: 10.1 FL (ref 8.1–13.5)
POTASSIUM SERPL-SCNC: 3.2 MMOL/L (ref 3.7–5.3)
RBC # BLD AUTO: 3.56 M/UL (ref 3.95–5.11)
SODIUM SERPL-SCNC: 139 MMOL/L (ref 135–144)
WBC OTHER # BLD: 11.5 K/UL (ref 3.5–11.3)

## 2023-10-05 PROCEDURE — 97535 SELF CARE MNGMENT TRAINING: CPT

## 2023-10-05 PROCEDURE — 6370000000 HC RX 637 (ALT 250 FOR IP): Performed by: SURGERY

## 2023-10-05 PROCEDURE — 36415 COLL VENOUS BLD VENIPUNCTURE: CPT

## 2023-10-05 PROCEDURE — 80048 BASIC METABOLIC PNL TOTAL CA: CPT

## 2023-10-05 PROCEDURE — 83735 ASSAY OF MAGNESIUM: CPT

## 2023-10-05 PROCEDURE — 85025 COMPLETE CBC W/AUTO DIFF WBC: CPT

## 2023-10-05 PROCEDURE — 97530 THERAPEUTIC ACTIVITIES: CPT

## 2023-10-05 PROCEDURE — 6360000002 HC RX W HCPCS: Performed by: SURGERY

## 2023-10-05 PROCEDURE — 2580000003 HC RX 258: Performed by: SURGERY

## 2023-10-05 PROCEDURE — 97162 PT EVAL MOD COMPLEX 30 MIN: CPT

## 2023-10-05 PROCEDURE — 6370000000 HC RX 637 (ALT 250 FOR IP): Performed by: HOSPITALIST

## 2023-10-05 PROCEDURE — 1200000000 HC SEMI PRIVATE

## 2023-10-05 PROCEDURE — 97116 GAIT TRAINING THERAPY: CPT

## 2023-10-05 PROCEDURE — 97166 OT EVAL MOD COMPLEX 45 MIN: CPT

## 2023-10-05 PROCEDURE — 2060000000 HC ICU INTERMEDIATE R&B

## 2023-10-05 RX ADMIN — OXYCODONE HYDROCHLORIDE 2.5 MG: 5 TABLET ORAL at 18:25

## 2023-10-05 RX ADMIN — METOPROLOL TARTRATE 25 MG: 25 TABLET, FILM COATED ORAL at 19:52

## 2023-10-05 RX ADMIN — FUROSEMIDE 40 MG: 40 TABLET ORAL at 08:15

## 2023-10-05 RX ADMIN — AMLODIPINE BESYLATE 5 MG: 5 TABLET ORAL at 08:15

## 2023-10-05 RX ADMIN — MORPHINE SULFATE 2 MG: 2 INJECTION, SOLUTION INTRAMUSCULAR; INTRAVENOUS at 06:45

## 2023-10-05 RX ADMIN — LISINOPRIL 20 MG: 20 TABLET ORAL at 08:15

## 2023-10-05 RX ADMIN — SODIUM CHLORIDE, PRESERVATIVE FREE 10 ML: 5 INJECTION INTRAVENOUS at 19:52

## 2023-10-05 RX ADMIN — FAMOTIDINE 20 MG: 20 TABLET ORAL at 19:52

## 2023-10-05 RX ADMIN — ENOXAPARIN SODIUM 30 MG: 100 INJECTION SUBCUTANEOUS at 08:15

## 2023-10-05 RX ADMIN — ATORVASTATIN CALCIUM 10 MG: 10 TABLET, FILM COATED ORAL at 08:15

## 2023-10-05 RX ADMIN — MORPHINE SULFATE 2 MG: 2 INJECTION, SOLUTION INTRAMUSCULAR; INTRAVENOUS at 10:48

## 2023-10-05 RX ADMIN — METOPROLOL TARTRATE 25 MG: 25 TABLET, FILM COATED ORAL at 08:15

## 2023-10-05 RX ADMIN — LISINOPRIL 20 MG: 20 TABLET ORAL at 19:52

## 2023-10-05 RX ADMIN — POTASSIUM BICARBONATE 40 MEQ: 782 TABLET, EFFERVESCENT ORAL at 06:14

## 2023-10-05 RX ADMIN — FUROSEMIDE 40 MG: 40 TABLET ORAL at 18:17

## 2023-10-05 RX ADMIN — DEXTROSE AND SODIUM CHLORIDE: 5; 450 INJECTION, SOLUTION INTRAVENOUS at 03:22

## 2023-10-05 ASSESSMENT — PAIN DESCRIPTION - ORIENTATION
ORIENTATION: POSTERIOR
ORIENTATION: MID;LOWER

## 2023-10-05 ASSESSMENT — PAIN DESCRIPTION - LOCATION
LOCATION: HEAD
LOCATION: ABDOMEN;BACK
LOCATION: ABDOMEN
LOCATION: ABDOMEN

## 2023-10-05 ASSESSMENT — PAIN DESCRIPTION - DESCRIPTORS
DESCRIPTORS: ACHING

## 2023-10-05 ASSESSMENT — PAIN SCALES - GENERAL
PAINLEVEL_OUTOF10: 8
PAINLEVEL_OUTOF10: 4
PAINLEVEL_OUTOF10: 5
PAINLEVEL_OUTOF10: 6

## 2023-10-05 NOTE — CARE COORDINATION
Social work: Patient is interested in rehab at [facility], as she currently resides at the [facility]. Referral faxed, will need precert. UPDATE:  [facility] will have a bed for patient on Monday. [facility] will submit for precert. JONO started.

## 2023-10-05 NOTE — PROGRESS NOTES
Pt potassium is 3.2 this morning, 40mEq of efffer k given. Also, pt had adequate output with sykes throughout the night. Dr. Yisel Sams spoke with patient this morning and orders in to remove sykes. Sykes removed, purwick and brief placed. In addition, Dr. Yisel Sams ordered to keep abdominal binder open unless pt is up moving around.  All needs my at this time

## 2023-10-05 NOTE — PLAN OF CARE
Patient has been alert and oriented today. Patient vitals have been stable. PT/OT worked with patient today and patient was up to chair. Patient has had Soren Ser in place and had 500 out. Patient also got up to bedside commode and had a tiny watery loose bowel movement and is passing gas. Patient incision has been open to air and remains clean dry intact with no drainage. Problem: Discharge Planning  Goal: Discharge to home or other facility with appropriate resources  10/5/2023 1805 by Jony Beltran RN  Outcome: Progressing     Problem: Chronic Conditions and Co-morbidities  Goal: Patient's chronic conditions and co-morbidity symptoms are monitored and maintained or improved  10/5/2023 1805 by Jony Beltran RN  Outcome: Progressing     Problem: Pain  Goal: Verbalizes/displays adequate comfort level or baseline comfort level  10/5/2023 1805 by Jony Beltran RN  Outcome: Progressing     Problem: Skin/Tissue Integrity  Goal: Absence of new skin breakdown  Description: 1. Monitor for areas of redness and/or skin breakdown  2. Assess vascular access sites hourly  3. Every 4-6 hours minimum:  Change oxygen saturation probe site  4. Every 4-6 hours:  If on nasal continuous positive airway pressure, respiratory therapy assess nares and determine need for appliance change or resting period.   10/5/2023 1805 by Jony Beltran RN  Outcome: Progressing     Problem: Safety - Adult  Goal: Free from fall injury  10/5/2023 1805 by Jony Beltran RN  Outcome: Progressing     Problem: ABCDS Injury Assessment  Goal: Absence of physical injury  10/5/2023 1805 by Jony Beltran RN  Outcome: Progressing

## 2023-10-05 NOTE — PROGRESS NOTES
attack)     over 3 years ago from 2023    [facility] as ambulation aid         Past Surgical History:     Past Surgical History:   Procedure Laterality Date    ARM SURGERY Right     CATARACT REMOVAL      CHOLECYSTECTOMY      EYE SURGERY      FEMUR FRACTURE SURGERY Left     HEMICOLECTOMY Right 10/4/2023    OPEN RIGHT HEMICOLECTOMY WITH MOBILIZATION OF HEPATIC FLEXURE, LYSIS OF ADHESIONS, TAP BLOCK performed by Lata Silver MD at [facility] Right     JOINT REPLACEMENT Bilateral     knees    TONSILLECTOMY          Medications Prior to Admission:       Prior to Admission medications    Medication Sig Start Date End Date Taking? Authorizing Provider   lisinopril (PRINIVIL;ZESTRIL) 20 MG tablet Take 1 tablet by mouth in the morning and at bedtime    Libra Mata MD   metoprolol tartrate (LOPRESSOR) 25 MG tablet Take 1 tablet by mouth 2 times daily    Libra Mata MD   Coenzyme Q10 (COQ10) 100 MG CAPS Take 1 tablet by mouth daily    Libra Mata MD   amLODIPine (NORVASC) 5 MG tablet Take 1 tablet by mouth daily    Libra Mata MD   atorvastatin (LIPITOR) 10 MG tablet Take 1 tablet by mouth daily    Libra Mata MD   Multiple Vitamins-Minerals (CENTRUM SILVER 50+WOMEN) TABS Take 1 tablet by mouth daily    Libra Mata MD   Calcium Carb-Cholecalciferol (CALCIUM + VITAMIN D3 PO) Take 1 tablet by mouth daily    Libra Mata MD   furosemide (LASIX) 40 MG tablet Take 1 tablet by mouth 2 times daily    Libra Mata MD   denosumab (PROLIA) 60 MG/ML SOSY SC injection Inject 1 mL into the skin once Every 6 months    Libra Mata MD   traMADol (ULTRAM) 50 MG tablet take 1 tablet by mouth every 6 hours if needed for MODERATE pain (SCALE 4 - 7) 6/28/23   Libra Mata MD        Allergies:       Patient has no known allergies. Social History:     Tobacco:    reports that she quit smoking about 34 years ago.  Her smoking use

## 2023-10-05 NOTE — PROGRESS NOTES
endurance training  Patient Goals   Patient Goals : To get better       Education  Patient Education  Education Given To: Patient  Education Provided: Role of Therapy;Plan of Care;Precautions; Energy Conservation;Transfer Training;Equipment; Fall Prevention Strategies  Education Provided Comments: Significant time spent on pt education this date as pt requiring frequent repetition from staff. Pt educated on: purpose of acute PT eval, importance of continued mobility throughout admission, prevention of sedentary complications, safe transfers & ambulation w/ RW, and PT POC. Pt w/ poor return demo. Pt requires continued reinforcement of education. Education Method: Demonstration;Verbal  Barriers to Learning: Cognition; Hearing  Education Outcome: Continued education needed      Therapy Time   Individual Concurrent Group Co-treatment   Time In 1100         Time Out 1141         Minutes 41         Treatment time: 26 minutes     Co-treatment with OT warranted secondary to decreased safety and independence requiring 2 skilled therapy professionals to address individual discipline's goals. PT addressing pre gait trunk strengthening, weight shifting prior to transfers, and transfer training.       Alexandra Lackey, PT

## 2023-10-05 NOTE — CARE COORDINATION
Case Management Assessment  Initial Evaluation    Date/Time of Evaluation: 10/5/2023 2:19 PM  Assessment Completed by: Yasmin Jaquez RN    If patient is discharged prior to next notation, then this note serves as note for discharge by case management. Patient Name: Shanti Donaldson                   YOB: 1935  Diagnosis: Colonic mass [K63.89]                   Date / Time: 10/4/2023  5:38 AM    Patient Admission Status: Inpatient   Readmission Risk (Low < 19, Mod (19-27), High > 27): Readmission Risk Score: 15.1    Current PCP: Nicolette Narayanan MD  PCP verified by CM? Yes    Chart Reviewed: Yes      History Provided by: Patient, Child/Family  Patient Orientation: Alert and Oriented    Patient Cognition: Alert    Hospitalization in the last 30 days (Readmission):  No    If yes, Readmission Assessment in  Navigator will be completed. Advance Directives:      Code Status: DNR-CCA   Patient's Primary Decision Maker is: Legal Next of Kin    Primary Decision Maker: Miles Griffiths - Child - [phone]    Discharge Planning:    Patient lives with: Alone Type of Home: Apartment, Assisted living  Primary Care Giver: Self  Patient Support Systems include: Children   Current Financial resources: Medicare  Current community resources: ECF/Home Care  Current services prior to admission: Durable Medical Equipment            Current DME: Shower Chair, Walker, Other (Comment) (emergency pull cords)            Type of Home Care services:  OT, PT    ADLS  Prior functional level: Assistance with the following:, Bathing, Housework, Cooking, Shopping  Current functional level: Assistance with the following:, Bathing, Housework, Cooking, Shopping    PT AM-PAC: 11 /24  OT AM-PAC: 16 /24    Family can provide assistance at DC: No  Would you like Case Management to discuss the discharge plan with any other family members/significant others, and if so, who?  Yes  Plans to Return to Present Housing: Unknown at

## 2023-10-05 NOTE — ACP (ADVANCE CARE PLANNING)
Advance Care Planning     Advance Care Planning Activator (Inpatient)  Conversation Note      Date of ACP Conversation: 10/5/2023     Conversation Conducted with: Patient with Decision Making Capacity    ACP Activator: Shelah Dandy, RN          Health Care Decision Maker:     Current Designated Health Care Decision Maker:     Primary Decision Maker: Miles Griffiths - Child - [phone]  Click here to complete Healthcare Decision Makers including section of the Healthcare Decision Maker Relationship (ie \"Primary\")  Today we documented Decision Maker(s) consistent with Legal Next of Kin hierarchy. Care Preferences    Ventilation: \"If you were in your present state of health and suddenly became very ill and were unable to breathe on your own, what would your preference be about the use of a ventilator (breathing machine) if it were available to you? \"      Would the patient desire the use of ventilator (breathing machine)?: no    \"If your health worsens and it becomes clear that your chance of recovery is unlikely, what would your preference be about the use of a ventilator (breathing machine) if it were available to you? \"     Would the patient desire the use of ventilator (breathing machine)?: No      Resuscitation  \"CPR works best to restart the heart when there is a sudden event, like a heart attack, in someone who is otherwise healthy. Unfortunately, CPR does not typically restart the heart for people who have serious health conditions or who are very sick. \"    \"In the event your heart stopped as a result of an underlying serious health condition, would you want attempts to be made to restart your heart (answer \"yes\" for attempt to resuscitate) or would you prefer a natural death (answer \"no\" for do not attempt to resuscitate)? \" no       [] Yes   [] No   Educated Patient / Masha Quevedo regarding differences between Advance Directives and portable DNR orders.     Length of ACP Conversation in minutes: Conversation Outcomes:  ACP discussion completed    Follow-up plan:    [] Schedule follow-up conversation to continue planning  [] Referred individual to Provider for additional questions/concerns   [] Advised patient/agent/surrogate to review completed ACP document and update if needed with changes in condition, patient preferences or care setting    [] This note routed to one or more involved healthcare providers        Confirmed with patient and medical POA Miles Griffiths ( son) is DNR CCA. It has been ordered but does not have DNR CCA bracelet on. Asked RN to place appropriate bracelet to patient.

## 2023-10-05 NOTE — PLAN OF CARE
Pt had uneventful night, vitals stable and pain has been controlled with PRN morphine. Abdominal binder remains and pt has been repositioned Q2. IVF continued. Link remains patient with adequate urine output. Pt has been A/O x4 with periods of slight confusion stating that she forgot she was at the hospital. Pt tolerated clear liquid diet throughout the night. All needs met at this time. Safety maintained. Problem: Discharge Planning  Goal: Discharge to home or other facility with appropriate resources  Outcome: Progressing  Flowsheets (Taken 10/4/2023 2026)  Discharge to home or other facility with appropriate resources: Identify barriers to discharge with patient and caregiver     Problem: Chronic Conditions and Co-morbidities  Goal: Patient's chronic conditions and co-morbidity symptoms are monitored and maintained or improved  Outcome: Progressing  Flowsheets (Taken 10/4/2023 2026)  Care Plan - Patient's Chronic Conditions and Co-Morbidity Symptoms are Monitored and Maintained or Improved: Monitor and assess patient's chronic conditions and comorbid symptoms for stability, deterioration, or improvement     Problem: Pain  Goal: Verbalizes/displays adequate comfort level or baseline comfort level  Outcome: Progressing     Problem: Skin/Tissue Integrity  Goal: Absence of new skin breakdown  Description: 1. Monitor for areas of redness and/or skin breakdown  2. Assess vascular access sites hourly  3. Every 4-6 hours minimum:  Change oxygen saturation probe site  4. Every 4-6 hours:  If on nasal continuous positive airway pressure, respiratory therapy assess nares and determine need for appliance change or resting period.   Outcome: Progressing     Problem: Safety - Adult  Goal: Free from fall injury  Outcome: Progressing     Problem: ABCDS Injury Assessment  Goal: Absence of physical injury  Outcome: Progressing

## 2023-10-05 NOTE — PROGRESS NOTES
Occupational Therapy  Facility/Department: [facility] PROGRESSIVE CARE  Occupational Therapy Initial Assessment    Name: Irvin Pham  : 1935  MRN: [id number]  Date of Service: 10/5/2023    CARLINE Nath  reports patient is medically stable for therapy treatment this date. Chart reviewed prior to treatment and patient is agreeable for therapy. All lines intact and patient positioned comfortably at end of treatment. All patient needs addressed prior to ending therapy session. Discharge Recommendations:  Patient would benefit from continued therapy after discharge  Pt currently functioning below baseline. Recommend daily inpatient skilled therapy at time of discharge to maximize long term outcomes and prevent re-admission. Please refer to AM-PAC score for current level of function. OT Equipment Recommendations  Equipment Needed: Yes  Mobility Devices: ADL Assistive Devices  ADL Assistive Devices: Emergency Alert System;Long-handled Shoe Horn;Long-handled Sponge;Reacher;Sock-Aid Hard       Patient Diagnosis(es): The encounter diagnosis was Colonic mass. Past Medical History:  has a past medical history of Anemia, Arthritis, Cancer ([address]), DDD (degenerative disc disease), lumbar, Diabetes mellitus ([address]), History of blood transfusion, Hyperlipidemia, Hypertension, Scoliosis, Shoulder arthritis, TIA (transient ischemic attack), and Walker as ambulation aid. Past Surgical History:  has a past surgical history that includes eye surgery; Cataract removal; joint replacement (Bilateral); Femur fracture surgery (Left); Hip fracture surgery (Right); Cholecystectomy; Tonsillectomy; Arm Surgery (Right); and hemicolectomy (Right, 10/4/2023). PER H&P / Procedure: OPEN RIGHT HEMICOLECTOMY WITH MOBILIZATION OF HEPATIC FLEXURE, LYSIS OF ADHESIONS, TAP BLOCK, omentopexy      Assessment   Performance deficits / Impairments: Decreased functional mobility ; Decreased ADL status; Decreased safe awareness;Decreased surgical precations, condition specific education and recommendations for discharge/AE with use of handouts as needed. Patient Goals   Patient goals : To feel better and move better       Therapy Time   Individual Concurrent Group Co-treatment   Time In 1055         Time Out 1143         Minutes 48          Tx time: 30 min    Co-treatment with PT warranted secondary to decreased safety and independence requiring 2 skilled therapy professionals to address individual discipline's goals.           Winter Vizcarra, OT

## 2023-10-06 LAB
ANION GAP SERPL CALCULATED.3IONS-SCNC: 9 MMOL/L (ref 9–17)
BASOPHILS # BLD: 0 K/UL (ref 0–0.2)
BASOPHILS NFR BLD: 0 %
BUN SERPL-MCNC: 8 MG/DL (ref 8–23)
BUN/CREAT SERPL: 11 (ref 9–20)
CA-I BLD-SCNC: 1.09 MMOL/L (ref 1.15–1.33)
CALCIUM SERPL-MCNC: 7.6 MG/DL (ref 8.6–10.4)
CHLORIDE SERPL-SCNC: 102 MMOL/L (ref 98–107)
CO2 SERPL-SCNC: 26 MMOL/L (ref 20–31)
CREAT SERPL-MCNC: 0.7 MG/DL (ref 0.5–0.9)
EOSINOPHIL # BLD: 0 K/UL (ref 0–0.4)
EOSINOPHILS RELATIVE PERCENT: 0 % (ref 1–4)
ERYTHROCYTE [DISTWIDTH] IN BLOOD BY AUTOMATED COUNT: 25.1 % (ref 11.8–14.4)
GFR SERPL CREATININE-BSD FRML MDRD: >60 ML/MIN/1.73M2
GLUCOSE SERPL-MCNC: 143 MG/DL (ref 70–99)
HCT VFR BLD AUTO: 31.7 % (ref 36.3–47.1)
HGB BLD-MCNC: 9.8 G/DL (ref 11.9–15.1)
IMM GRANULOCYTES # BLD AUTO: 0 K/UL (ref 0–0.3)
IMM GRANULOCYTES NFR BLD: 0 %
LYMPHOCYTES NFR BLD: 0.88 K/UL (ref 1–4.8)
LYMPHOCYTES RELATIVE PERCENT: 10 % (ref 24–44)
MAGNESIUM SERPL-MCNC: 1.5 MG/DL (ref 1.6–2.6)
MCH RBC QN AUTO: 25.9 PG (ref 25.2–33.5)
MCHC RBC AUTO-ENTMCNC: 30.9 G/DL (ref 28.4–34.8)
MCV RBC AUTO: 83.9 FL (ref 82.6–102.9)
MONOCYTES NFR BLD: 1.06 K/UL (ref 0.2–0.8)
MONOCYTES NFR BLD: 12 % (ref 1–7)
MORPHOLOGY: ABNORMAL
NEUTROPHILS NFR BLD: 78 % (ref 36–66)
NEUTS SEG NFR BLD: 6.86 K/UL (ref 1.8–7.7)
NRBC BLD-RTO: 0 PER 100 WBC
PLATELET # BLD AUTO: 278 K/UL (ref 138–453)
PMV BLD AUTO: 9.7 FL (ref 8.1–13.5)
POTASSIUM SERPL-SCNC: 3.4 MMOL/L (ref 3.7–5.3)
RBC # BLD AUTO: 3.78 M/UL (ref 3.95–5.11)
SODIUM SERPL-SCNC: 137 MMOL/L (ref 135–144)
WBC OTHER # BLD: 8.8 K/UL (ref 3.5–11.3)

## 2023-10-06 PROCEDURE — 6370000000 HC RX 637 (ALT 250 FOR IP): Performed by: STUDENT IN AN ORGANIZED HEALTH CARE EDUCATION/TRAINING PROGRAM

## 2023-10-06 PROCEDURE — 36415 COLL VENOUS BLD VENIPUNCTURE: CPT

## 2023-10-06 PROCEDURE — 6370000000 HC RX 637 (ALT 250 FOR IP): Performed by: HOSPITALIST

## 2023-10-06 PROCEDURE — 97535 SELF CARE MNGMENT TRAINING: CPT

## 2023-10-06 PROCEDURE — 2580000003 HC RX 258: Performed by: FAMILY MEDICINE

## 2023-10-06 PROCEDURE — 6360000002 HC RX W HCPCS: Performed by: STUDENT IN AN ORGANIZED HEALTH CARE EDUCATION/TRAINING PROGRAM

## 2023-10-06 PROCEDURE — 6370000000 HC RX 637 (ALT 250 FOR IP): Performed by: NURSE PRACTITIONER

## 2023-10-06 PROCEDURE — 97112 NEUROMUSCULAR REEDUCATION: CPT

## 2023-10-06 PROCEDURE — 6370000000 HC RX 637 (ALT 250 FOR IP): Performed by: SURGERY

## 2023-10-06 PROCEDURE — 97530 THERAPEUTIC ACTIVITIES: CPT

## 2023-10-06 PROCEDURE — 82330 ASSAY OF CALCIUM: CPT

## 2023-10-06 PROCEDURE — 83735 ASSAY OF MAGNESIUM: CPT

## 2023-10-06 PROCEDURE — 97116 GAIT TRAINING THERAPY: CPT

## 2023-10-06 PROCEDURE — 2580000003 HC RX 258: Performed by: SURGERY

## 2023-10-06 PROCEDURE — 1200000000 HC SEMI PRIVATE

## 2023-10-06 PROCEDURE — 85025 COMPLETE CBC W/AUTO DIFF WBC: CPT

## 2023-10-06 PROCEDURE — 6360000002 HC RX W HCPCS: Performed by: SURGERY

## 2023-10-06 PROCEDURE — 80048 BASIC METABOLIC PNL TOTAL CA: CPT

## 2023-10-06 RX ORDER — FUROSEMIDE 40 MG/1
40 TABLET ORAL DAILY
Status: DISCONTINUED | OUTPATIENT
Start: 2023-10-07 | End: 2023-10-09 | Stop reason: HOSPADM

## 2023-10-06 RX ORDER — MAGNESIUM SULFATE IN WATER 40 MG/ML
2000 INJECTION, SOLUTION INTRAVENOUS
Status: COMPLETED | OUTPATIENT
Start: 2023-10-06 | End: 2023-10-06

## 2023-10-06 RX ORDER — HYDROCODONE BITARTRATE AND ACETAMINOPHEN 5; 325 MG/1; MG/1
1 TABLET ORAL EVERY 8 HOURS PRN
Status: ON HOLD | COMMUNITY
End: 2023-10-07 | Stop reason: HOSPADM

## 2023-10-06 RX ORDER — ACETAMINOPHEN 325 MG/1
650 TABLET ORAL EVERY 6 HOURS PRN
Status: DISCONTINUED | OUTPATIENT
Start: 2023-10-06 | End: 2023-10-07

## 2023-10-06 RX ORDER — OMEPRAZOLE 20 MG/1
20 CAPSULE, DELAYED RELEASE ORAL DAILY
COMMUNITY

## 2023-10-06 RX ORDER — DOCUSATE SODIUM 100 MG/1
100 CAPSULE, LIQUID FILLED ORAL EVERY EVENING
COMMUNITY

## 2023-10-06 RX ADMIN — ENOXAPARIN SODIUM 30 MG: 100 INJECTION SUBCUTANEOUS at 08:04

## 2023-10-06 RX ADMIN — MAGNESIUM SULFATE HEPTAHYDRATE 2000 MG: 40 INJECTION, SOLUTION INTRAVENOUS at 09:56

## 2023-10-06 RX ADMIN — POTASSIUM CHLORIDE 40 MEQ: 1500 TABLET, EXTENDED RELEASE ORAL at 06:26

## 2023-10-06 RX ADMIN — DEXTROSE AND SODIUM CHLORIDE: 5; 450 INJECTION, SOLUTION INTRAVENOUS at 01:00

## 2023-10-06 RX ADMIN — FUROSEMIDE 40 MG: 40 TABLET ORAL at 09:44

## 2023-10-06 RX ADMIN — POTASSIUM BICARBONATE 20 MEQ: 782 TABLET, EFFERVESCENT ORAL at 13:06

## 2023-10-06 RX ADMIN — FAMOTIDINE 20 MG: 20 TABLET ORAL at 21:32

## 2023-10-06 RX ADMIN — ATORVASTATIN CALCIUM 10 MG: 10 TABLET, FILM COATED ORAL at 09:44

## 2023-10-06 RX ADMIN — AMLODIPINE BESYLATE 5 MG: 5 TABLET ORAL at 09:44

## 2023-10-06 RX ADMIN — ACETAMINOPHEN 650 MG: 325 TABLET ORAL at 04:30

## 2023-10-06 RX ADMIN — LISINOPRIL 20 MG: 20 TABLET ORAL at 21:32

## 2023-10-06 RX ADMIN — LISINOPRIL 20 MG: 20 TABLET ORAL at 09:44

## 2023-10-06 RX ADMIN — METOPROLOL TARTRATE 25 MG: 25 TABLET, FILM COATED ORAL at 21:32

## 2023-10-06 RX ADMIN — METOPROLOL TARTRATE 25 MG: 25 TABLET, FILM COATED ORAL at 09:44

## 2023-10-06 RX ADMIN — MAGNESIUM SULFATE HEPTAHYDRATE 2000 MG: 40 INJECTION, SOLUTION INTRAVENOUS at 06:23

## 2023-10-06 RX ADMIN — OXYCODONE HYDROCHLORIDE 2.5 MG: 5 TABLET ORAL at 18:31

## 2023-10-06 RX ADMIN — DEXTROSE AND SODIUM CHLORIDE: 5; 450 INJECTION, SOLUTION INTRAVENOUS at 18:17

## 2023-10-06 ASSESSMENT — PAIN DESCRIPTION - LOCATION
LOCATION: ARM
LOCATION: ABDOMEN

## 2023-10-06 ASSESSMENT — PAIN DESCRIPTION - DESCRIPTORS: DESCRIPTORS: ACHING

## 2023-10-06 ASSESSMENT — PAIN SCALES - GENERAL: PAINLEVEL_OUTOF10: 7

## 2023-10-06 NOTE — PROGRESS NOTES
Progress note  [facility].,    Adult Hospitalist      Name: Fatemeh John  MRN: [id number]     Acct: [de-identified]  Room: [address] [place] [address]    Admit Date: 10/4/2023  5:38 AM  PCP: Alfa Amor MD    Primary Problem  Principal Problem:    Colonic mass  Resolved Problems:    * No resolved hospital problems. *        Assesment:     Colonic mass status post hemicolectomy   Hypertension  Hyperlipidemia  Hypokalemia  Iron deficiency anemia   Osteoporosis  Former smoker        Plan:     Patient  is on intermediate level  O2 to maintain oxygen saturation greater than 92%     Continue lisinopril, Lopressor, Norvasc  Continue Lipitor  Resume Lasix   Gentle hydration   Patient diet is advanced to full liquid diet  Pain control  Encourage ambulation  Serial abdominal exams    Serum calcium 7.6, check ionized calcium  Continue to monitor/telemetry/CBC with differential daily/BMP daily  DVT and GI prophylaxis. Continue medications as below  Replace electrolytes  Lasix can be resumed from tomorrow, if okay with general surgery fluids can be stopped     Discussed with son present at the bedside.   Code status is DNR CCA / DNI  Continue medication as below    Scheduled Meds:   potassium bicarb-citric acid  40 mEq Oral Once    [START ON 10/7/2023] furosemide  40 mg Oral Daily    sodium chloride flush  5-40 mL IntraVENous 2 times per day    famotidine  20 mg Oral Daily    Or    famotidine (PEPCID) injection  20 mg IntraVENous Daily    enoxaparin  30 mg SubCUTAneous Daily    amLODIPine  5 mg Oral Daily    atorvastatin  10 mg Oral Daily    lisinopril  20 mg Oral BID    metoprolol tartrate  25 mg Oral BID     Continuous Infusions:   sodium chloride      dextrose 5 % and 0.45 % NaCl 50 mL/hr at 10/06/23 0624     PRN Meds:  acetaminophen, 650 mg, Q6H PRN  sodium chloride flush, 5-40 mL, PRN  sodium chloride, , PRN  oxyCODONE, 2.5 mg, Q6H PRN  morphine, 2 mg, Q4H PRN   Or  morphine, 2 mg, Q2H PRN  ondansetron, 4 mg, Q6H

## 2023-10-06 NOTE — PROGRESS NOTES
TC from Mercedez Garcia, pt's private Pt Advocate through [facility] [employer] ( [phone]) who was updated on pt's discharge plan and possible discharge Monday.

## 2023-10-06 NOTE — PROGRESS NOTES
Occupational Therapy  Facility/Department: [facility] [[email or web address] PROGRESSIVE CARE  Daily Treatment Note  NAME: Monique Suggs  : 1935  MRN: [id number]    Date of Service: 10/6/2023    Discharge Recommendations:  Patient would benefit from continued therapy after discharge  Pt currently functioning below baseline. Recommend daily inpatient skilled therapy at time of discharge to maximize long term outcomes and prevent re-admission. Please refer to AM-PAC score for current level of function. AM-PAC Score: 16     Patient Diagnosis(es): The encounter diagnosis was Colonic mass. Assessment    Assessment: Patient is progressing toward goals, able to ambulate around the room with 1 assist and increased time and effort to complete d/t fatigue. Patient requires additional assistance for bed mobility. Patient would benefit from continued skilled OT treatment session to improve endurance, strength, and safety/IND with ADL completion to return to PLOF as tolerated. Encouraged patient to complete ROM \"ABC's\" while seated in the chair to improve ROM in BUE's. Activity Tolerance: Patient limited by endurance  Discharge Recommendations: Patient would benefit from continued therapy after discharge      Plan   Occupational Therapy Plan  Times Per Week: 4-5x/wk 1x/day as ariadna  Current Treatment Recommendations: Strengthening;Balance training;Functional mobility training; Endurance training; Safety education & training;Equipment evaluation, education, & procurement;Patient/Caregiver education & training;Self-Care / ADL;Cognitive/Perceptual training     Restrictions   Restrictions/Precautions  Restrictions/Precautions: General Precautions, Fall Risk  Required Braces or Orthoses?: Yes  Required Braces or Orthoses  Other: Abdominal Binder (on as needed when out of bed)  Position Activity Restriction  Other position/activity restrictions: Up in chair, up w/ assist, RUE IV, ext cath    Subjective   Subjective  Subjective: \"Okay, what do you want me to mobility from bed>window>chair, took a rest break, then from chair<>around the bed with RW and Min A.)  Interventions:  (Pt takes small steps and often has difficulty with advancing her BLE's as they appeared to be sticking to the floor, requiring increased time and effort for mobility.)  Assistive Device: Walker, rolling;Gait belt     ADL  Grooming: Minimal assistance  Grooming Skilled Clinical Factors: Min A for opening containers for oral hygiene  UE Bathing Skilled Clinical Factors: Patient already bathed by staff, MAX A to don ABD binder  LE Dressing: Maximum assistance  LE Dressing Skilled Clinical Factors: Max A to don/doff socks, however patient verbalized she has all long handled equipment at home that she uses, or home health aide helps assist        Safety Devices  Type of Devices: Call light within reach; Chair alarm in place; Left in chair;Gait belt;Nurse notified     Patient Education  Education Given To: Patient  Education Provided: Role of Therapy;Transfer Training;Equipment;Plan of Care;Energy Conservation; Fall Prevention Strategies; ADL Adaptive Strategies;Precautions  Education Provided Comments: fall prevention/call light use, OT POC, role of OT in acute care, benefits of being OOB, pursed lip breathing, recommendations for discharge/AE, safety in function, proper log roll tech  Education Method: Verbal  Barriers to Learning: None  Education Outcome: Verbalized understanding;Continued education needed    Goals  Short Term Goals  Time Frame for Short Term Goals: By discharge, pt to demo  Short Term Goal 1: ADL transfers and functional mobility to SBA with use of AD as needed. Short Term Goal 2: increased BUE strength by 1/2 grade to assist wtih functional tasks/I with B UE HEP with use of handouts as needed. Short Term Goal 3: toileting to CGA with use of AD/grab bars as needed. Short Term Goal 4: bed mobility to SBA with use of bedrails as needed.   Short Term Goal 5: increased standing ariadna > 10

## 2023-10-06 NOTE — PLAN OF CARE
Problem: Discharge Planning  Goal: Discharge to home or other facility with appropriate resources  10/6/2023 0124 by Jenny Oviedo RN  Outcome: Progressing  10/5/2023 1805 by Leo Devlin RN  Outcome: Progressing     Problem: Chronic Conditions and Co-morbidities  Goal: Patient's chronic conditions and co-morbidity symptoms are monitored and maintained or improved  10/6/2023 0124 by Jenny Oviedo RN  Outcome: Progressing  10/5/2023 1805 by Leo Devlin RN  Outcome: Progressing     Problem: Pain  Goal: Verbalizes/displays adequate comfort level or baseline comfort level  10/6/2023 0124 by Jenny Oviedo RN  Outcome: Progressing  10/5/2023 1805 by Leo Devlin RN  Outcome: Progressing     Problem: Skin/Tissue Integrity  Goal: Absence of new skin breakdown  Description: 1. Monitor for areas of redness and/or skin breakdown  2. Assess vascular access sites hourly  3. Every 4-6 hours minimum:  Change oxygen saturation probe site  4. Every 4-6 hours:  If on nasal continuous positive airway pressure, respiratory therapy assess nares and determine need for appliance change or resting period.   10/6/2023 0124 by Jenny Oviedo RN  Outcome: Progressing  10/5/2023 1805 by Leo Devlin RN  Outcome: Progressing     Problem: Safety - Adult  Goal: Free from fall injury  10/6/2023 0124 by Jenny Oviedo RN  Outcome: Progressing  10/5/2023 1805 by Leo Devlin RN  Outcome: Progressing     Problem: ABCDS Injury Assessment  Goal: Absence of physical injury  10/6/2023 0124 by Jenny Oviedo RN  Outcome: Progressing  10/5/2023 1805 by Leo Devlin RN  Outcome: Progressing

## 2023-10-06 NOTE — PLAN OF CARE
Problem: Metabolic/Fluid and Electrolytes - Adult  Goal: Electrolytes maintained within normal limits  Outcome: Progressing    Pt received potassium and magnesium replacement today. Continues on IV fluids. Problem: Gastrointestinal - Adult  Goal: Minimal or absence of nausea and vomiting  Outcome: Progressing     Advanced to full liquid this am No complaints of nausea or vomiting. Tolerating 30% of breakfast. Has had at least 2 large loose brown stools today. Awaiting precert for [facility] with plan for bed on Monday.

## 2023-10-06 NOTE — PROGRESS NOTES
Physical Therapy  Facility/Department: [facility] PROGRESSIVE CARE  Physical Therapy Treatment Note    Name: Dacia Walden  : 1935  MRN: [id number]  Date of Service: 10/6/2023    Discharge Recommendations:  Patient would benefit from continued therapy after discharge          Patient Diagnosis(es): The encounter diagnosis was Colonic mass. Past Medical History:  has a past medical history of Anemia, Arthritis, Cancer ([address]), DDD (degenerative disc disease), lumbar, Diabetes mellitus ([address]), History of blood transfusion, Hyperlipidemia, Hypertension, Scoliosis, Shoulder arthritis, TIA (transient ischemic attack), and Walker as ambulation aid. Past Surgical History:  has a past surgical history that includes eye surgery; Cataract removal; joint replacement (Bilateral); Femur fracture surgery (Left); Hip fracture surgery (Right); Cholecystectomy; Tonsillectomy; Arm Surgery (Right); and hemicolectomy (Right, 10/4/2023). Assessment   Body Structures, Functions, Activity Limitations Requiring Skilled Therapeutic Intervention: Decreased functional mobility ; Decreased ADL status; Decreased strength;Decreased safe awareness;Decreased cognition;Decreased endurance;Decreased balance;Decreased high-level IADLs; Increased pain;Decreased posture;Decreased coordination  Assessment: Pt with deficits of bed mobility, transfers, ambulation, balance, cognition, posture, safety awareness and endurance this session. With current deficits, Pt HIGH risk for falls & requires continued PT to maximize independence with functional mobility, balance, safety awareness & activity tolerance to improve overall tolerance of ADL's.   Pt currently functioning below baseline with acute change of functional status s/p open R hemicolectomy with mobilization of hepatic flexure & lysis adhesions with -PAC mobility score of , and recommend daily inpatient skilled therapy at time of discharge to maximize long term outcomes and prevent WFL & reduce post op complications  Short Term Goal 5: Ed pt on home ex's, safety, balance & endurance training, pressure relief with Pt able to demonstrate effective pressure relief techniques, ABD precautions, fall prevention, & issue written Pt Ed  Patient Goals   Patient Goals : To get better       Education  Patient Education  Education Given To: Patient  Education Provided: Role of Therapy; Fall Prevention Strategies;Transfer Training;Equipment; Energy Conservation;Precautions  Education Provided Comments: purpose of cont'd PT & mobility progression s/p ABD surgery, safety awareness, fall risk prevention, safe transfers & ambulation w/ RW, circulation ex's, pressure relief and breathing techniques, prevention of sedentary complications, and PT POC. Pt demonstrated FAIR carryover  Pt requires continued reinforcement of education. Education Method: Demonstration;Verbal  Barriers to Learning: Cognition; Hearing  Education Outcome: Verbalized understanding;Continued education needed      Therapy Time   Individual Concurrent Group Co-treatment   Time In 1303         Time Out 1400         Minutes 57             Treatment time: 57 minutes      [address], PT

## 2023-10-06 NOTE — PROGRESS NOTES
Transitions of Care Pharmacy Service   Medication Review    The patient's list of current home medications has been reviewed. Source(s) of information: med list from facility [facility])      PROVIDER ACTION REQUESTED  Medications that need to be addressed by a physician/nurse practitioner:    Medication Action Requested   Lasix 40mg BID     Current facility dose is 40mg once daily instead -- consider adjusting order if appropriate         Please feel free to call me with any questions about this encounter. Thank you. Daljit Pierre [facility]   Transitions of Care Pharmacy Service  Phone:  [phone]  Fax: [phone]      Electronically signed by Daljit Pierre [facility] on 10/6/2023 at 1:46 PM         Medications Prior to Admission:   docusate sodium (COLACE) 100 MG capsule, Take 1 capsule by mouth every evening  HYDROcodone-acetaminophen (NORCO) 5-325 MG per tablet, Take 1 tablet by mouth every 8 hours as needed for Pain. omeprazole (PRILOSEC) 20 MG delayed release capsule, Take 1 capsule by mouth daily  lisinopril (PRINIVIL;ZESTRIL) 20 MG tablet, Take 1 tablet by mouth in the morning and at bedtime  metoprolol tartrate (LOPRESSOR) 25 MG tablet, Take 1 tablet by mouth 2 times daily  amLODIPine (NORVASC) 5 MG tablet, Take 1 tablet by mouth nightly  atorvastatin (LIPITOR) 10 MG tablet, Take 1 tablet by mouth at bedtime  Calcium Carb-Cholecalciferol (CALCIUM + VITAMIN D3 PO), Take 1 tablet by mouth daily  furosemide (LASIX) 40 MG tablet, Take 1 tablet by mouth daily  denosumab (PROLIA) 60 MG/ML SOSY SC injection, Inject 1 mL into the skin once Every 6 months  traMADol (ULTRAM) 50 MG tablet, Take 1 tablet by mouth every 12 hours as needed for Pain (pain scale 7-10).

## 2023-10-06 NOTE — CARE COORDINATION
Social work: [employer] will have a bed on mOnday for this pt if precert comes in. Faxed updates today to help with precert. Will need raf, and Rx at discharge. Michel started.  [facility]

## 2023-10-07 LAB
ANION GAP SERPL CALCULATED.3IONS-SCNC: 8 MMOL/L (ref 9–17)
BASOPHILS # BLD: 0 K/UL (ref 0–0.2)
BASOPHILS NFR BLD: 0 %
BUN SERPL-MCNC: 7 MG/DL (ref 8–23)
BUN/CREAT SERPL: 10 (ref 9–20)
CALCIUM SERPL-MCNC: 7.9 MG/DL (ref 8.6–10.4)
CHLORIDE SERPL-SCNC: 102 MMOL/L (ref 98–107)
CO2 SERPL-SCNC: 24 MMOL/L (ref 20–31)
CREAT SERPL-MCNC: 0.7 MG/DL (ref 0.5–0.9)
EOSINOPHIL # BLD: 0.11 K/UL (ref 0–0.4)
EOSINOPHILS RELATIVE PERCENT: 1 % (ref 1–4)
ERYTHROCYTE [DISTWIDTH] IN BLOOD BY AUTOMATED COUNT: 25 % (ref 11.8–14.4)
GFR SERPL CREATININE-BSD FRML MDRD: >60 ML/MIN/1.73M2
GLUCOSE SERPL-MCNC: 134 MG/DL (ref 70–99)
HCT VFR BLD AUTO: 31.6 % (ref 36.3–47.1)
HGB BLD-MCNC: 9.5 G/DL (ref 11.9–15.1)
IMM GRANULOCYTES # BLD AUTO: 0.11 K/UL (ref 0–0.3)
IMM GRANULOCYTES NFR BLD: 1 %
LYMPHOCYTES NFR BLD: 0.89 K/UL (ref 1–4.8)
LYMPHOCYTES RELATIVE PERCENT: 8 % (ref 24–44)
MAGNESIUM SERPL-MCNC: 2.4 MG/DL (ref 1.6–2.6)
MCH RBC QN AUTO: 25.5 PG (ref 25.2–33.5)
MCHC RBC AUTO-ENTMCNC: 30.1 G/DL (ref 28.4–34.8)
MCV RBC AUTO: 84.7 FL (ref 82.6–102.9)
MONOCYTES NFR BLD: 1.22 K/UL (ref 0.2–0.8)
MONOCYTES NFR BLD: 11 % (ref 1–7)
MORPHOLOGY: ABNORMAL
NEUTROPHILS NFR BLD: 79 % (ref 36–66)
NEUTS SEG NFR BLD: 8.77 K/UL (ref 1.8–7.7)
NRBC BLD-RTO: 0 PER 100 WBC
PLATELET # BLD AUTO: 301 K/UL (ref 138–453)
PMV BLD AUTO: 10.7 FL (ref 8.1–13.5)
POTASSIUM SERPL-SCNC: 4.2 MMOL/L (ref 3.7–5.3)
RBC # BLD AUTO: 3.73 M/UL (ref 3.95–5.11)
SODIUM SERPL-SCNC: 134 MMOL/L (ref 135–144)
WBC OTHER # BLD: 11.1 K/UL (ref 3.5–11.3)

## 2023-10-07 PROCEDURE — 6360000002 HC RX W HCPCS: Performed by: SURGERY

## 2023-10-07 PROCEDURE — 6370000000 HC RX 637 (ALT 250 FOR IP): Performed by: STUDENT IN AN ORGANIZED HEALTH CARE EDUCATION/TRAINING PROGRAM

## 2023-10-07 PROCEDURE — 6370000000 HC RX 637 (ALT 250 FOR IP): Performed by: HOSPITALIST

## 2023-10-07 PROCEDURE — 85025 COMPLETE CBC W/AUTO DIFF WBC: CPT

## 2023-10-07 PROCEDURE — 6360000002 HC RX W HCPCS: Performed by: FAMILY MEDICINE

## 2023-10-07 PROCEDURE — 6370000000 HC RX 637 (ALT 250 FOR IP): Performed by: SURGERY

## 2023-10-07 PROCEDURE — 80048 BASIC METABOLIC PNL TOTAL CA: CPT

## 2023-10-07 PROCEDURE — 97535 SELF CARE MNGMENT TRAINING: CPT | Performed by: NURSE PRACTITIONER

## 2023-10-07 PROCEDURE — 97530 THERAPEUTIC ACTIVITIES: CPT | Performed by: NURSE PRACTITIONER

## 2023-10-07 PROCEDURE — 1200000000 HC SEMI PRIVATE

## 2023-10-07 PROCEDURE — 97110 THERAPEUTIC EXERCISES: CPT

## 2023-10-07 PROCEDURE — 2580000003 HC RX 258: Performed by: SURGERY

## 2023-10-07 PROCEDURE — 97530 THERAPEUTIC ACTIVITIES: CPT

## 2023-10-07 PROCEDURE — 6370000000 HC RX 637 (ALT 250 FOR IP): Performed by: NURSE PRACTITIONER

## 2023-10-07 PROCEDURE — 36415 COLL VENOUS BLD VENIPUNCTURE: CPT

## 2023-10-07 PROCEDURE — 83735 ASSAY OF MAGNESIUM: CPT

## 2023-10-07 PROCEDURE — 6370000000 HC RX 637 (ALT 250 FOR IP): Performed by: FAMILY MEDICINE

## 2023-10-07 PROCEDURE — 97116 GAIT TRAINING THERAPY: CPT

## 2023-10-07 RX ORDER — TRAMADOL HYDROCHLORIDE 50 MG/1
50 TABLET ORAL EVERY 12 HOURS PRN
Qty: 10 TABLET | Refills: 0 | Status: SHIPPED | OUTPATIENT
Start: 2023-10-07 | End: 2023-10-12

## 2023-10-07 RX ORDER — CALCIUM GLUCONATE 20 MG/ML
2000 INJECTION, SOLUTION INTRAVENOUS ONCE
Status: COMPLETED | OUTPATIENT
Start: 2023-10-07 | End: 2023-10-07

## 2023-10-07 RX ORDER — TRAMADOL HYDROCHLORIDE 50 MG/1
50 TABLET ORAL EVERY 8 HOURS PRN
Status: DISCONTINUED | OUTPATIENT
Start: 2023-10-07 | End: 2023-10-09 | Stop reason: HOSPADM

## 2023-10-07 RX ORDER — TRAMADOL HYDROCHLORIDE 50 MG/1
50 TABLET ORAL EVERY 8 HOURS PRN
Status: DISCONTINUED | OUTPATIENT
Start: 2023-10-07 | End: 2023-10-07

## 2023-10-07 RX ORDER — ACETAMINOPHEN 325 MG/1
650 TABLET ORAL EVERY 6 HOURS PRN
Status: DISCONTINUED | OUTPATIENT
Start: 2023-10-07 | End: 2023-10-09 | Stop reason: HOSPADM

## 2023-10-07 RX ADMIN — AMLODIPINE BESYLATE 5 MG: 5 TABLET ORAL at 10:32

## 2023-10-07 RX ADMIN — SODIUM CHLORIDE, PRESERVATIVE FREE 10 ML: 5 INJECTION INTRAVENOUS at 20:16

## 2023-10-07 RX ADMIN — FUROSEMIDE 40 MG: 40 TABLET ORAL at 10:32

## 2023-10-07 RX ADMIN — SODIUM CHLORIDE, PRESERVATIVE FREE 10 ML: 5 INJECTION INTRAVENOUS at 10:32

## 2023-10-07 RX ADMIN — OXYCODONE HYDROCHLORIDE 2.5 MG: 5 TABLET ORAL at 00:34

## 2023-10-07 RX ADMIN — LISINOPRIL 20 MG: 20 TABLET ORAL at 20:15

## 2023-10-07 RX ADMIN — CALCIUM GLUCONATE 2000 MG: 20 INJECTION, SOLUTION INTRAVENOUS at 15:39

## 2023-10-07 RX ADMIN — ATORVASTATIN CALCIUM 10 MG: 10 TABLET, FILM COATED ORAL at 10:32

## 2023-10-07 RX ADMIN — LISINOPRIL 20 MG: 20 TABLET ORAL at 10:31

## 2023-10-07 RX ADMIN — ACETAMINOPHEN 650 MG: 325 TABLET ORAL at 15:35

## 2023-10-07 RX ADMIN — FAMOTIDINE 20 MG: 20 TABLET ORAL at 20:15

## 2023-10-07 RX ADMIN — METOPROLOL TARTRATE 25 MG: 25 TABLET, FILM COATED ORAL at 20:15

## 2023-10-07 RX ADMIN — METOPROLOL TARTRATE 25 MG: 25 TABLET, FILM COATED ORAL at 10:31

## 2023-10-07 RX ADMIN — ENOXAPARIN SODIUM 30 MG: 100 INJECTION SUBCUTANEOUS at 10:31

## 2023-10-07 RX ADMIN — ACETAMINOPHEN 650 MG: 325 TABLET ORAL at 04:04

## 2023-10-07 ASSESSMENT — PAIN SCALES - GENERAL
PAINLEVEL_OUTOF10: 0
PAINLEVEL_OUTOF10: 3
PAINLEVEL_OUTOF10: 7
PAINLEVEL_OUTOF10: 6
PAINLEVEL_OUTOF10: 6

## 2023-10-07 ASSESSMENT — PAIN DESCRIPTION - DESCRIPTORS
DESCRIPTORS: ACHING;DISCOMFORT

## 2023-10-07 ASSESSMENT — PAIN DESCRIPTION - LOCATION
LOCATION: GENERALIZED

## 2023-10-07 NOTE — PROGRESS NOTES
Progress note  [facility].,    Adult Hospitalist      Name: Tesha Reeves  MRN: [id number]     Acct: [de-identified]  Room: [address] [place] [address]    Admit Date: 10/4/2023  5:38 AM  PCP: Bryce Woods MD    Primary Problem  Principal Problem:    Colonic mass  Resolved Problems:    * No resolved hospital problems. *        Assesment:     Colonic mass status post hemicolectomy   Hypertension  Hyperlipidemia  Hypokalemia  Iron deficiency anemia   Osteoporosis  Former smoker  Hypocalcemia        Plan:     Patient  is on intermediate level  O2 to maintain oxygen saturation greater than 92%     Continue lisinopril, Lopressor, Norvasc  Continue Lipitor  Resume Lasix   Gentle hydration   Diet is advanced to regular diet  Pain control  Encourage ambulation  Serial abdominal exams    Serum calcium 7.6, check ionized calcium is low at 1.09, orders given for IV calcium replacement  Continue to monitor/telemetry/CBC with differential daily/BMP daily  DVT and GI prophylaxis. Continue medications as below  Replace electrolytes  Fluids been discontinued, patient oral Lasix is resumed    Discussed with son present at the bedside.   Code status is DNR CCA / DNI  Continue medication as below  Discharge planning, patient will benefit from skilled skilled facility on discharge    Scheduled Meds:   calcium gluconate  2,000 mg IntraVENous Once    potassium bicarb-citric acid  40 mEq Oral Once    furosemide  40 mg Oral Daily    sodium chloride flush  5-40 mL IntraVENous 2 times per day    famotidine  20 mg Oral Daily    Or    famotidine (PEPCID) injection  20 mg IntraVENous Daily    enoxaparin  30 mg SubCUTAneous Daily    amLODIPine  5 mg Oral Daily    atorvastatin  10 mg Oral Daily    lisinopril  20 mg Oral BID    metoprolol tartrate  25 mg Oral BID     Continuous Infusions:   sodium chloride       PRN Meds:  traMADol, 50 mg, Q8H PRN  acetaminophen, 650 mg, Q6H PRN  sodium chloride flush, 5-40 mL, PRN  sodium chloride, , PRN  ondansetron,

## 2023-10-07 NOTE — PROGRESS NOTES
Surgical Progress Note    POD: 3    Chief complaint: No acute events overnight. Patient has had several bowel movements. She states pain is really controlled. She has been up to the chair. We are awaiting rehab bed. Patient doing fairly well    Vitals:    10/07/23 0500   BP:    Pulse: 75   Resp:    Temp:    SpO2:         Review of Systems -as per history present illness    Exam: General Appearance: alert and oriented to person, place and time, well-developed and well-nourished, in no acute distress  Skin: warm and dry, no rash or erythema  Head: normocephalic and atraumatic  Eyes: pupils equal, round, and reactive to light, extraocular eye movements intact, conjunctivae normal  ENT: hearing grossly normal bilaterally  Neck: neck supple and non tender without mass, no thyromegaly or thyroid nodules, no cervical lymphadenopathy   Cardiovascular: normal rate, normal S1 and S2, and no gallops  Abdomen: Soft, appropriate tender, surgical incision healing well with no Erythema or Infection, Staples Intact  Extremities: no cyanosis and no clubbing  Musculoskeletal: normal range of motion, no joint swelling, deformity or tenderness  Neurologic: no cranial nerve deficit and speech normal    I/O last 3 completed shifts:   In: 120 [P.O.:120]  Out: -     Hemoglobin   Date Value Ref Range Status   10/07/2023 9.5 (L) 11.9 - 15.1 g/dL Final     Hematocrit   Date Value Ref Range Status   10/07/2023 31.6 (L) 36.3 - 47.1 % Final     WBC   Date Value Ref Range Status   10/07/2023 11.1 3.5 - 11.3 k/uL Final     Sodium   Date Value Ref Range Status   10/07/2023 134 (L) 135 - 144 mmol/L Final     Potassium   Date Value Ref Range Status   10/07/2023 4.2 3.7 - 5.3 mmol/L Final     Chloride   Date Value Ref Range Status   10/07/2023 102 98 - 107 mmol/L Final     CO2   Date Value Ref Range Status   10/07/2023 24 20 - 31 mmol/L Final     Glucose   Date Value Ref Range Status   10/07/2023 134 (H) 70 - 99 mg/dL Final   04/26/2023 102 (H) 70 - 100 mg/dL Final       Assessment/Plan: Status post segmental colectomy. Discussed with the patient plan of care including rehab bed once available. Patient stable for discharge from surgery standpoint. We will place her on a low residue diet.         Irving Collins MD MD  10/7/2023 6:40 AM

## 2023-10-07 NOTE — PROGRESS NOTES
Occupational Therapy  Facility/Department: [facility]  Daily Treatment Note  NAME: Aida Mayer  : 1935  MRN: [id number]    Date of Service: 10/7/2023    Discharge Recommendations:  Patient would benefit from continued therapy after discharge  Pt currently functioning below baseline. Recommend daily inpatient skilled therapy at time of discharge to maximize long term outcomes and prevent re-admission. Please refer to AM-PAC score for current level of function. OT Equipment Recommendations  Equipment Needed: Yes  Mobility Devices: ADL Assistive Devices  ADL Assistive Devices: Emergency Alert System;Long-handled Shoe Horn;Long-handled Sponge;Reacher;Sock-Aid Hard      Patient Diagnosis(es): The primary encounter diagnosis was Acute postoperative pain. A diagnosis of Colonic mass was also pertinent to this visit. Assessment    [facility]: 17  Assessment: Pt progressing towards goals, although continues to have global deconditioning and decreased ability to complete ADLs. Pt would benefit from continued skilled OT services to address deficits in areas of functional balance, functional reach, ADL completion, safety awareness, ADL transfers, bed mobility, UE strength, and functional mobility, all limiting safe return home to [facility] and decrease caregiver burden. Activity Tolerance: Patient tolerated treatment well  Discharge Recommendations: Patient would benefit from continued therapy after discharge  Equipment Needed: Yes  Mobility Devices: ADL Assistive Devices      Plan   Occupational Therapy Plan  Times Per Week: 4-5x/wk 1x/day as ariadna  Current Treatment Recommendations: Strengthening;Balance training;Functional mobility training; Endurance training; Safety education & training;Equipment evaluation, education, & procurement;Patient/Caregiver education & training;Self-Care / ADL;Cognitive/Perceptual training        Subjective   Subjective  Subjective: Pt in chair and finishing PT tx  Orientation  Overall of care, evaluation/re-evaluation findings, any appropriate modifications to treatment plan, and relevant discharge planning. Instructions and training unique to this patient and this practitioner have been considered and implemented.       Paty Bueno OTR/L

## 2023-10-07 NOTE — PROGRESS NOTES
Physical Therapy  Facility/Department: [facility] PROGRESSIVE CARE  Daily Treatment Note  NAME: Zoila Clifton  : 1935  MRN: [id number]    Date of Service: 10/7/2023    Discharge Recommendations:  Patient would benefit from continued therapy after discharge        Patient Diagnosis(es): The primary encounter diagnosis was Acute postoperative pain. A diagnosis of Colonic mass was also pertinent to this visit. Assessment   Assessment: Patient showed good tolerance to today's session with willingness to perform ambulation and exercises. She required cues and time for gait tolerance. Patient was able to perform exercises with good tolerance. She would benefit form continued skilled physical therapy for continued increase strength and endurance to return to [facility]. Activity Tolerance: Patient limited by fatigue;Patient limited by endurance  AM-PAC scored     Plan    Physcial Therapy Plan  General Plan: 1 time a day 7 days a week  Current Treatment Recommendations: Strengthening;Balance training;Functional mobility training;Transfer training;Gait training;Neuromuscular re-education; Endurance training;Pain management;Home exercise program;Safety education & training;Patient/Caregiver education & training;Equipment evaluation, education, & procurement; Therapeutic activities;ADL/Self-care training;Positioning     Restrictions  Restrictions/Precautions  Restrictions/Precautions: General Precautions, Fall Risk, Surgical Protocols  Required Braces or Orthoses?: Yes  Required Braces or Orthoses  Other: Abdominal Binder  Position Activity Restriction  Other position/activity restrictions: Up with assist & to chair, Heels off bed at all times, telemetry, full liquid diet, ABD binder, ext urinary catheter, RUE IV, ALARMS     Subjective    Subjective  Subjective: Per nurse Oh Sotelo patient was able to have therapy. Just returned from bathroom returned to bed. Patient in bed upon arrival and agree able to having therapy.      Objective

## 2023-10-07 NOTE — PLAN OF CARE
Problem: Discharge Planning  Goal: Discharge to home or other facility with appropriate resources  Outcome: Progressing  Flowsheets   Discharge to home or other facility with appropriate resources:   Identify barriers to discharge with patient and caregiver   Arrange for needed discharge resources and transportation as appropriate   Identify discharge learning needs (meds, wound care, etc)     Problem: Chronic Conditions and Co-morbidities  Goal: Patient's chronic conditions and co-morbidity symptoms are monitored and maintained or improved  Outcome: [id number] [address] [place] - Patient's Chronic Conditions and Co-Morbidity Symptoms are Monitored and Maintained or Improved:   Monitor and assess patient's chronic conditions and comorbid symptoms for stability, deterioration, or improvement   Collaborate with multidisciplinary team to address chronic and comorbid conditions and prevent exacerbation or deterioration   Update acute care plan with appropriate goals if chronic or comorbid symptoms are exacerbated and prevent overall improvement and discharge       Problem: Pain  Goal: Verbalizes/displays adequate comfort level or baseline comfort level  Outcome: Progressing  Flowsheets   Verbalizes/displays adequate comfort level or baseline comfort level:   Encourage patient to monitor pain and request assistance   Assess pain using appropriate pain scale   Administer analgesics based on type and severity of pain and evaluate response   Implement non-pharmacological measures as appropriate and evaluate response   Notify Licensed Independent Practitioner if interventions unsuccessful or patient reports new pain       Problem: Skin/Tissue Integrity  Goal: Absence of new skin breakdown  Description: 1. Monitor for areas of redness and/or skin breakdown  2. Assess vascular access sites hourly  3. Every 4-6 hours minimum:  Change oxygen saturation probe site  4.   Every 4-6 hours:  If on nasal continuous positive normal limits  Outcome: Progressing  Flowsheets   Electrolytes maintained within normal limits:   Monitor labs and assess patient for signs and symptoms of electrolyte imbalances   Administer electrolyte replacement as ordered   Monitor response to electrolyte replacements, including repeat lab results as appropriate  Goal: Hemodynamic stability and optimal renal function maintained  Outcome: Progressing  Flowsheets   Hemodynamic stability and optimal renal function maintained:   Monitor labs and assess for signs and symptoms of volume excess or deficit   Monitor intake, output and patient weight   Encourage oral intake as appropriate   Monitor response to interventions for patient's volume status, including labs, urine output, blood pressure (other measures as available)  Goal: Glucose maintained within prescribed range  Outcome: Progressing  Flowsheets   Glucose maintained within prescribed range:   Assess for signs and symptoms of hyperglycemia and hypoglycemia   Monitor blood glucose as ordered   Administer ordered medications to maintain glucose within target range

## 2023-10-07 NOTE — DISCHARGE INSTRUCTIONS
Surgery Patient Discharge Instructions    WOUND CARE:   Leave staples in place  until office visit. Wash gently with soap and water daily around incision    BATHING:  Ok to shower. Do not submerge the incision in the bathtub, hot tub, etc    DRIVING: No driving for while on pain medication      LIFTING: Avoid lifting objects heavier than 10 lbs for 4 weeks. DIET:   Ok to resume regular diet.      SPECIAL INSTRUCTIONS:  After you leave the hospital, call your doctor if any of the following occurs:   Pain or symptoms that worsen   Other new symptoms   Signs of infection, including fever and chills   Nausea and/or vomiting that you can't control with the medications you were given   Pain that you can't control with the medications you've been given   Excessive tenderness or swelling   Changes in bowel or sexual function   Dizziness or lightheadedness   Rash or hives       Watch for signs of infection:    Excessive warmth or bright redness around your incisions    Leakage of bloody or cloudy fluid from you incisions    Fever over 100.5

## 2023-10-07 NOTE — PLAN OF CARE
Problem: Discharge Planning  Goal: Discharge to home or other facility with appropriate resources  10/7/2023 1846 by Nyra Bumpers, RN  Outcome: Progressing  10/7/2023 0849 by Jose Roberto Correa RN  Outcome: Progressing  Flowsheets (Taken 10/7/2023 2504)  Discharge to home or other facility with appropriate resources:   Identify barriers to discharge with patient and caregiver   Arrange for needed discharge resources and transportation as appropriate   Identify discharge learning needs (meds, wound care, etc)     Problem: Chronic Conditions and Co-morbidities  Goal: Patient's chronic conditions and co-morbidity symptoms are monitored and maintained or improved  10/7/2023 1846 by Nyra Bumpers, RN  Outcome: Progressing  10/7/2023 0849 by Jose Roberto Correa RN  Outcome: Progressing  Flowsheets (Taken 10/7/2023 0849)  Care Plan - Patient's Chronic Conditions and Co-Morbidity Symptoms are Monitored and Maintained or Improved:   Monitor and assess patient's chronic conditions and comorbid symptoms for stability, deterioration, or improvement   Collaborate with multidisciplinary team to address chronic and comorbid conditions and prevent exacerbation or deterioration   Update acute care plan with appropriate goals if chronic or comorbid symptoms are exacerbated and prevent overall improvement and discharge     Problem: Pain  Goal: Verbalizes/displays adequate comfort level or baseline comfort level  10/7/2023 1846 by Nyra Bumpers, RN  Outcome: Progressing  10/7/2023 0849 by Jose Roberto Correa RN  Outcome: Progressing  Flowsheets (Taken 10/7/2023 8144)  Verbalizes/displays adequate comfort level or baseline comfort level:   Encourage patient to monitor pain and request assistance   Assess pain using appropriate pain scale   Administer analgesics based on type and severity of pain and evaluate response   Implement non-pharmacological measures as appropriate and evaluate response   Notify Licensed Independent Practitioner if returns to baseline bowel function  10/7/2023 1846 by Maya Rodriguez RN  Outcome: Progressing  10/7/2023 0849 by Luca Foy RN  Outcome: Progressing  Flowsheets (Taken 10/7/2023 5532)  Maintains or returns to baseline bowel function:   Assess bowel function   Encourage oral fluids to ensure adequate hydration   Administer IV fluids as ordered to ensure adequate hydration   Encourage mobilization and activity   Administer ordered medications as needed  Goal: Maintains adequate nutritional intake  10/7/2023 1846 by Maya Rodriguez RN  Outcome: Progressing  10/7/2023 0849 by Luca Foy RN  Outcome: Progressing  Flowsheets (Taken 10/7/2023 8081)  Maintains adequate nutritional intake:   Monitor percentage of each meal consumed   Identify factors contributing to decreased intake, treat as appropriate   Monitor intake and output, weight and lab values   Assist with meals as needed   Obtain nutritional consult as needed  Goal: Establish and maintain optimal ostomy function  Outcome: Progressing     Problem: Metabolic/Fluid and Electrolytes - Adult  Goal: Electrolytes maintained within normal limits  10/7/2023 1846 by Maya Rodriguez RN  Outcome: Progressing  10/7/2023 0849 by Luca Foy RN  Outcome: Progressing  Flowsheets (Taken 10/7/2023 9827)  Electrolytes maintained within normal limits:   Monitor labs and assess patient for signs and symptoms of electrolyte imbalances   Administer electrolyte replacement as ordered   Monitor response to electrolyte replacements, including repeat lab results as appropriate  Goal: Hemodynamic stability and optimal renal function maintained  10/7/2023 1846 by Maya Rodriguez RN  Outcome: Progressing  10/7/2023 0849 by Luca Foy RN  Outcome: Progressing  Flowsheets (Taken 10/7/2023 9470)  Hemodynamic stability and optimal renal function maintained:   Monitor labs and assess for signs and symptoms of volume excess or deficit   Monitor intake, output and patient

## 2023-10-08 LAB
GLUCOSE BLD-MCNC: 108 MG/DL (ref 65–105)
GLUCOSE BLD-MCNC: 151 MG/DL (ref 65–105)
GLUCOSE BLD-MCNC: 200 MG/DL (ref 65–105)
GLUCOSE BLD-MCNC: 218 MG/DL (ref 65–105)

## 2023-10-08 PROCEDURE — 97530 THERAPEUTIC ACTIVITIES: CPT | Performed by: NURSE PRACTITIONER

## 2023-10-08 PROCEDURE — 6370000000 HC RX 637 (ALT 250 FOR IP): Performed by: HOSPITALIST

## 2023-10-08 PROCEDURE — 6370000000 HC RX 637 (ALT 250 FOR IP): Performed by: SURGERY

## 2023-10-08 PROCEDURE — 6360000002 HC RX W HCPCS: Performed by: SURGERY

## 2023-10-08 PROCEDURE — 97110 THERAPEUTIC EXERCISES: CPT

## 2023-10-08 PROCEDURE — 2580000003 HC RX 258: Performed by: SURGERY

## 2023-10-08 PROCEDURE — 97116 GAIT TRAINING THERAPY: CPT

## 2023-10-08 PROCEDURE — 97530 THERAPEUTIC ACTIVITIES: CPT

## 2023-10-08 PROCEDURE — 82947 ASSAY GLUCOSE BLOOD QUANT: CPT

## 2023-10-08 PROCEDURE — 6370000000 HC RX 637 (ALT 250 FOR IP): Performed by: STUDENT IN AN ORGANIZED HEALTH CARE EDUCATION/TRAINING PROGRAM

## 2023-10-08 PROCEDURE — 1200000000 HC SEMI PRIVATE

## 2023-10-08 PROCEDURE — 97535 SELF CARE MNGMENT TRAINING: CPT | Performed by: NURSE PRACTITIONER

## 2023-10-08 PROCEDURE — 6370000000 HC RX 637 (ALT 250 FOR IP): Performed by: FAMILY MEDICINE

## 2023-10-08 RX ORDER — ENOXAPARIN SODIUM 100 MG/ML
40 INJECTION SUBCUTANEOUS DAILY
Status: DISCONTINUED | OUTPATIENT
Start: 2023-10-09 | End: 2023-10-09 | Stop reason: HOSPADM

## 2023-10-08 RX ADMIN — LISINOPRIL 20 MG: 20 TABLET ORAL at 20:39

## 2023-10-08 RX ADMIN — TRAMADOL HYDROCHLORIDE 50 MG: 50 TABLET, COATED ORAL at 03:24

## 2023-10-08 RX ADMIN — ENOXAPARIN SODIUM 30 MG: 100 INJECTION SUBCUTANEOUS at 09:54

## 2023-10-08 RX ADMIN — METOPROLOL TARTRATE 25 MG: 25 TABLET, FILM COATED ORAL at 09:51

## 2023-10-08 RX ADMIN — LISINOPRIL 20 MG: 20 TABLET ORAL at 09:51

## 2023-10-08 RX ADMIN — FAMOTIDINE 20 MG: 20 TABLET ORAL at 20:39

## 2023-10-08 RX ADMIN — ACETAMINOPHEN 650 MG: 325 TABLET ORAL at 23:50

## 2023-10-08 RX ADMIN — AMLODIPINE BESYLATE 5 MG: 5 TABLET ORAL at 09:51

## 2023-10-08 RX ADMIN — METOPROLOL TARTRATE 25 MG: 25 TABLET, FILM COATED ORAL at 20:38

## 2023-10-08 RX ADMIN — SODIUM CHLORIDE, PRESERVATIVE FREE 10 ML: 5 INJECTION INTRAVENOUS at 21:04

## 2023-10-08 RX ADMIN — ATORVASTATIN CALCIUM 10 MG: 10 TABLET, FILM COATED ORAL at 09:51

## 2023-10-08 RX ADMIN — FUROSEMIDE 40 MG: 40 TABLET ORAL at 09:51

## 2023-10-08 RX ADMIN — ACETAMINOPHEN 650 MG: 325 TABLET ORAL at 17:48

## 2023-10-08 RX ADMIN — SODIUM CHLORIDE, PRESERVATIVE FREE 10 ML: 5 INJECTION INTRAVENOUS at 09:55

## 2023-10-08 RX ADMIN — ACETAMINOPHEN 650 MG: 325 TABLET ORAL at 09:51

## 2023-10-08 ASSESSMENT — PAIN DESCRIPTION - LOCATION
LOCATION: SHOULDER
LOCATION: NECK;GENERALIZED

## 2023-10-08 ASSESSMENT — PAIN DESCRIPTION - ORIENTATION: ORIENTATION: RIGHT

## 2023-10-08 ASSESSMENT — PAIN - FUNCTIONAL ASSESSMENT: PAIN_FUNCTIONAL_ASSESSMENT: PREVENTS OR INTERFERES SOME ACTIVE ACTIVITIES AND ADLS

## 2023-10-08 ASSESSMENT — PAIN SCALES - GENERAL
PAINLEVEL_OUTOF10: 5
PAINLEVEL_OUTOF10: 3
PAINLEVEL_OUTOF10: 9
PAINLEVEL_OUTOF10: 0

## 2023-10-08 ASSESSMENT — PAIN DESCRIPTION - DESCRIPTORS: DESCRIPTORS: ACHING;DISCOMFORT

## 2023-10-08 NOTE — PLAN OF CARE
Problem: Discharge Planning  Goal: Discharge to home or other facility with appropriate resources  10/8/2023 0413 by Rosalba Goodwin RN  Outcome: Progressing     Problem: Chronic Conditions and Co-morbidities  Goal: Patient's chronic conditions and co-morbidity symptoms are monitored and maintained or improved  10/8/2023 0413 by Rosalba Goodwin RN  Outcome: Progressing     Problem: Pain  Goal: Verbalizes/displays adequate comfort level or baseline comfort level  10/8/2023 0413 by Rosalba Goodwin RN  Outcome: Progressing     Problem: Skin/Tissue Integrity  Goal: Absence of new skin breakdown  Description: 1. Monitor for areas of redness and/or skin breakdown  2. Assess vascular access sites hourly  3. Every 4-6 hours minimum:  Change oxygen saturation probe site  4. Every 4-6 hours:  If on nasal continuous positive airway pressure, respiratory therapy assess nares and determine need for appliance change or resting period.   10/8/2023 0413 by Rosalba Goodwin RN  Outcome: Progressing

## 2023-10-08 NOTE — PROGRESS NOTES
Progress note  [facility]    Adult Hospitalist      Name: Peggy Sanchez  MRN: [id number]     Acct: [de-identified]  Room: 2018/2018-02    Admit Date: 10/4/2023  5:38 AM  PCP: Eamon Dietz MD    Primary Problem  Principal Problem:    Colonic mass  Resolved Problems:    * No resolved hospital problems. *        Assesment:     Colonic mass status post hemicolectomy   Hypertension  Hyperlipidemia  Hypokalemia  Iron deficiency anemia   Osteoporosis  Former smoker  Hypocalcemia        Plan:     Patient  is on intermediate level  O2 to maintain oxygen saturation greater than 92%     Continue lisinopril, Lopressor, Norvasc  Continue Lipitor  Resume Lasix   Gentle hydration   Diet is advanced to regular diet  Pain control  Encourage ambulation  Serial abdominal exams    Serum calcium 7.6, check ionized calcium is low at 1.09, orders given for IV calcium replacement  Continue to monitor/telemetry/CBC with differential daily/BMP daily  DVT and GI prophylaxis. Continue medications as below  Replace electrolytes  Fluids been discontinued, patient oral Lasix is resumed    Discussed with son present at the bedside.   Code status is DNR CCA / DNI  Continue medication as below  Discharge planning, patient will benefit from skilled skilled facility on discharge    Scheduled Meds:   [START ON 10/9/2023] enoxaparin  40 mg SubCUTAneous Daily    potassium bicarb-citric acid  40 mEq Oral Once    furosemide  40 mg Oral Daily    sodium chloride flush  5-40 mL IntraVENous 2 times per day    famotidine  20 mg Oral Daily    Or    famotidine (PEPCID) injection  20 mg IntraVENous Daily    amLODIPine  5 mg Oral Daily    atorvastatin  10 mg Oral Daily    lisinopril  20 mg Oral BID    metoprolol tartrate  25 mg Oral BID     Continuous Infusions:   sodium chloride       PRN Meds:  traMADol, 50 mg, Q8H PRN  acetaminophen, 650 mg, Q6H PRN  sodium chloride flush, 5-40 mL, PRN  sodium chloride, , PRN  ondansetron, 4 mg, Q6H PRN  potassium Hyperlipidemia     Hypertension     Scoliosis     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    TIA (transient ischemic attack)     over 3 years ago from 2023    Lynnette Sis as ambulation aid         Past Surgical History:     Past Surgical History:   Procedure Laterality Date    ARM SURGERY Right     CATARACT REMOVAL      CHOLECYSTECTOMY      EYE SURGERY      FEMUR FRACTURE SURGERY Left     HEMICOLECTOMY Right 10/4/2023    OPEN RIGHT HEMICOLECTOMY WITH MOBILIZATION OF HEPATIC FLEXURE, LYSIS OF ADHESIONS, TAP BLOCK performed by Deann Vieira MD at [facility] Right     JOINT REPLACEMENT Bilateral     knees    TONSILLECTOMY          Medications Prior to Admission:       Prior to Admission medications    Medication Sig Start Date End Date Taking? Authorizing Provider   traMADol (ULTRAM) 50 MG tablet Take 1 tablet by mouth every 12 hours as needed for Pain (pain scale 7-10) for up to 5 days.  Max Daily Amount: 100 mg 10/7/23 10/12/23 Yes Margoth Odom,    docusate sodium (COLACE) 100 MG capsule Take 1 capsule by mouth every evening   Yes Libra Mata MD   omeprazole (PRILOSEC) 20 MG delayed release capsule Take 1 capsule by mouth daily   Yes Libra Mata MD   lisinopril (PRINIVIL;ZESTRIL) 20 MG tablet Take 1 tablet by mouth in the morning and at bedtime    Libra Mata MD   metoprolol tartrate (LOPRESSOR) 25 MG tablet Take 1 tablet by mouth 2 times daily    Libra Mata MD   amLODIPine (NORVASC) 5 MG tablet Take 1 tablet by mouth nightly    Libra Mata MD   atorvastatin (LIPITOR) 10 MG tablet Take 1 tablet by mouth at bedtime    Libra Mata MD   Calcium Carb-Cholecalciferol (CALCIUM + VITAMIN D3 PO) Take 1 tablet by mouth daily    Libra Mata MD   furosemide (LASIX) 40 MG tablet Take 1 tablet by mouth daily    Libra Mata MD   denosumab (PROLIA) 60 MG/ML SOSY SC injection Inject 1 mL into the skin once Every 6 months

## 2023-10-08 NOTE — PLAN OF CARE
Problem: Discharge Planning  Goal: Discharge to home or other facility with appropriate resources  Outcome: Progressing     Problem: Chronic Conditions and Co-morbidities  Goal: Patient's chronic conditions and co-morbidity symptoms are monitored and maintained or improved  Outcome: Progressing     Problem: Pain  Goal: Verbalizes/displays adequate comfort level or baseline comfort level  Outcome: Progressing     Problem: Skin/Tissue Integrity  Goal: Absence of new skin breakdown  Description: 1. Monitor for areas of redness and/or skin breakdown  2. Assess vascular access sites hourly  3. Every 4-6 hours minimum:  Change oxygen saturation probe site  4. Every 4-6 hours:  If on nasal continuous positive airway pressure, respiratory therapy assess nares and determine need for appliance change or resting period.   Outcome: Progressing     Problem: Safety - Adult  Goal: Free from fall injury  Outcome: Progressing     Problem: ABCDS Injury Assessment  Goal: Absence of physical injury  Outcome: Progressing     Problem: Gastrointestinal - Adult  Goal: Minimal or absence of nausea and vomiting  Outcome: Progressing  Goal: Maintains or returns to baseline bowel function  Outcome: Progressing  Goal: Maintains adequate nutritional intake  Outcome: Progressing  Goal: Establish and maintain optimal ostomy function  Outcome: Progressing     Problem: Metabolic/Fluid and Electrolytes - Adult  Goal: Electrolytes maintained within normal limits  Outcome: Progressing  Goal: Hemodynamic stability and optimal renal function maintained  Outcome: Progressing  Goal: Glucose maintained within prescribed range  Outcome: Progressing

## 2023-10-08 NOTE — PROGRESS NOTES
Report given to Tegan Meraz. Pt belongings packed and brought up to room 3382 with no complications. Care ongoing.

## 2023-10-08 NOTE — PROGRESS NOTES
Occupational Therapy  Facility/Department: [facility]  Daily Treatment Note  NAME: Edi Hollingsworth  : 1935  MRN: [id number]    Date of Service: 10/8/2023    Discharge Recommendations:  Patient would benefit from continued therapy after discharge  Pt currently functioning below baseline. Recommend daily inpatient skilled therapy at time of discharge to maximize long term outcomes and prevent re-admission. Please refer to AM-PAC score for current level of function. OT Equipment Recommendations  Equipment Needed: Yes  Mobility Devices: ADL Assistive Devices  ADL Assistive Devices: Emergency Alert System;Long-handled Shoe Horn;Long-handled Sponge;Reacher;Sock-Aid Hard      Patient Diagnosis(es): The primary encounter diagnosis was Acute postoperative pain. A diagnosis of Colonic mass was also pertinent to this visit. Assessment    [facility]: [facility]  Assessment: Pt progressing towards goals, although continues to have global deconditioning and decreased ability to complete ADLs. Pt would benefit from continued skilled OT services to address deficits in areas of functional balance, functional reach, ADL completion, safety awareness, ADL transfers, bed mobility, UE strength, and functional mobility, all limiting safe return home to [facility] and decrease caregiver burden. Activity Tolerance: Patient tolerated treatment well  Discharge Recommendations: Patient would benefit from continued therapy after discharge  Equipment Needed: Yes  Mobility Devices: ADL Assistive Devices      Plan   Occupational Therapy Plan  Times Per Week: 4-5x/wk 1x/day as ariadna  Current Treatment Recommendations: Strengthening;Balance training;Functional mobility training; Endurance training; Safety education & training;Equipment evaluation, education, & procurement;Patient/Caregiver education & training;Self-Care / ADL;Cognitive/Perceptual training     Subjective   Subjective  Subjective: Pt in bed, requesting to use restroom  Orientation  Overall Training  Transfer Training: Yes  Overall Level of Assistance: Minimum assistance  Interventions: Verbal cues; Safety awareness training; Tactile cues; Visual cues  Sit to Stand: Minimum assistance  Stand to Sit: Minimum assistance  Toilet Transfer: Minimum assistance  Gait  Overall Level of Assistance: Minimum assistance; Additional time  Interventions: Verbal cues; Safety awareness training  Speed/Li: Slow  Assistive Device: Walker, rolling     ADL  Toileting: Maximum assistance        Safety Devices  Type of Devices: All fall risk precautions in place;Call light within reach; Chair alarm in place;Gait belt;Nurse notified; Left in chair;Patient at risk for falls     Patient Education  Education Given To: Patient  Education Provided: Role of Therapy;Transfer Training;Equipment;Plan of Care;Energy Conservation; Fall Prevention Strategies; ADL Adaptive Strategies;Precautions  Education Provided Comments: fall prevention/call light use, OT POC, role of OT in acute care, benefits of being OOB, pursed lip breathing, recommendations for discharge/AE, safety in function, proper log roll tech  Education Method: Verbal  Barriers to Learning: None  Education Outcome: Verbalized understanding;Continued education needed    Goals  Short Term Goals  Time Frame for Short Term Goals: By discharge, pt to demo  Short Term Goal 1: ADL transfers and functional mobility to SBA with use of AD as needed. Short Term Goal 2: increased BUE strength by 1/2 grade to assist wtih functional tasks/I with B UE HEP with use of handouts as needed. Short Term Goal 3: toileting to CGA with use of AD/grab bars as needed. Short Term Goal 4: bed mobility to SBA with use of bedrails as needed. Short Term Goal 5: increased standing ariadna > 10 min with CGA using AD as needed to reduce risk of falls during functional tasks.   Long Term Goals  Long Term Goal 1: Pt to be I with fall prevention edu, EC/WS tech, surgical precations, condition specific education

## 2023-10-08 NOTE — PROGRESS NOTES
Physical Therapy  Facility/Department: [facility] MED SURG  Daily Treatment Note  NAME: Adriane Chambers  : 1935  MRN: [id number]    Date of Service: 10/8/2023    Discharge Recommendations:  Patient would benefit from continued therapy after discharge   Patient Diagnosis(es): The primary encounter diagnosis was Acute postoperative pain. A diagnosis of Colonic mass was also pertinent to this visit. Assessment   Assessment: Pt requires extended time with min to mod A for all mobility. Pt tolerates short distance ambulation however gait is unsteady with a shuffling pattern. AM-PAC score of  for current level of function. Pt would benefit from continued skilled therapy to increase independence with all functional mobility. Activity Tolerance: Patient limited by endurance; Patient limited by fatigue     Plan    Physcial Therapy Plan  General Plan: 1 time a day 7 days a week  Current Treatment Recommendations: Strengthening;Balance training;Functional mobility training;Transfer training;Gait training;Neuromuscular re-education; Endurance training;Pain management;Home exercise program;Safety education & training;Patient/Caregiver education & training;Equipment evaluation, education, & procurement; Therapeutic activities;ADL/Self-care training;Positioning     Restrictions  Restrictions/Precautions  Restrictions/Precautions: General Precautions, Fall Risk, Surgical Protocols  Required Braces or Orthoses?: Yes  Required Braces or Orthoses  Other: Abdominal Binder  Position Activity Restriction  Other position/activity restrictions:  Up with assist & to chair, Heels off bed at all times, telemetry, full liquid diet, ABD binder, ext urinary catheter, RUE IV, ALARMS     Subjective    Subjective  Subjective: Pt c/o low back pain however agreeable to PT session (Nurse gives approval for PT session)  Orientation  Overall Orientation Status: Within Functional Limits   Objective   Bed Mobility Training  Bed Mobility Training: No (Pt up

## 2023-10-09 VITALS
TEMPERATURE: 98 F | WEIGHT: 125 LBS | DIASTOLIC BLOOD PRESSURE: 60 MMHG | SYSTOLIC BLOOD PRESSURE: 138 MMHG | RESPIRATION RATE: 16 BRPM | HEART RATE: 100 BPM | HEIGHT: 59 IN | OXYGEN SATURATION: 95 % | BODY MASS INDEX: 25.2 KG/M2

## 2023-10-09 LAB
GLUCOSE BLD-MCNC: 155 MG/DL (ref 65–105)
GLUCOSE BLD-MCNC: 172 MG/DL (ref 65–105)
GLUCOSE BLD-MCNC: 94 MG/DL (ref 65–105)

## 2023-10-09 PROCEDURE — 97530 THERAPEUTIC ACTIVITIES: CPT

## 2023-10-09 PROCEDURE — 6360000002 HC RX W HCPCS: Performed by: SURGERY

## 2023-10-09 PROCEDURE — 2580000003 HC RX 258: Performed by: SURGERY

## 2023-10-09 PROCEDURE — 97535 SELF CARE MNGMENT TRAINING: CPT

## 2023-10-09 PROCEDURE — 97116 GAIT TRAINING THERAPY: CPT

## 2023-10-09 PROCEDURE — 82947 ASSAY GLUCOSE BLOOD QUANT: CPT

## 2023-10-09 PROCEDURE — 6370000000 HC RX 637 (ALT 250 FOR IP): Performed by: FAMILY MEDICINE

## 2023-10-09 PROCEDURE — 97110 THERAPEUTIC EXERCISES: CPT

## 2023-10-09 PROCEDURE — 6370000000 HC RX 637 (ALT 250 FOR IP): Performed by: HOSPITALIST

## 2023-10-09 PROCEDURE — 6370000000 HC RX 637 (ALT 250 FOR IP): Performed by: STUDENT IN AN ORGANIZED HEALTH CARE EDUCATION/TRAINING PROGRAM

## 2023-10-09 RX ADMIN — ENOXAPARIN SODIUM 40 MG: 100 INJECTION SUBCUTANEOUS at 09:17

## 2023-10-09 RX ADMIN — METOPROLOL TARTRATE 25 MG: 25 TABLET, FILM COATED ORAL at 09:17

## 2023-10-09 RX ADMIN — AMLODIPINE BESYLATE 5 MG: 5 TABLET ORAL at 09:16

## 2023-10-09 RX ADMIN — FUROSEMIDE 40 MG: 40 TABLET ORAL at 09:17

## 2023-10-09 RX ADMIN — ATORVASTATIN CALCIUM 10 MG: 10 TABLET, FILM COATED ORAL at 09:16

## 2023-10-09 RX ADMIN — SODIUM CHLORIDE, PRESERVATIVE FREE 10 ML: 5 INJECTION INTRAVENOUS at 09:17

## 2023-10-09 RX ADMIN — ACETAMINOPHEN 650 MG: 325 TABLET ORAL at 11:26

## 2023-10-09 RX ADMIN — LISINOPRIL 20 MG: 20 TABLET ORAL at 09:16

## 2023-10-09 ASSESSMENT — PAIN SCALES - GENERAL
PAINLEVEL_OUTOF10: 0
PAINLEVEL_OUTOF10: 5
PAINLEVEL_OUTOF10: 0

## 2023-10-09 NOTE — PROGRESS NOTES
difficulty w/ bed mobility; already laying on side upon entry to room & then needed ModA to bring trunk into an upright position. Poor carryover noted of logroll tech, so pt cued for sequencing of movement & use of bedrail for UB assist)  Interventions: Verbal cues; Visual cues; Tactile cues; Safety awareness training  Supine to Sit: Moderate assistance  Sit to Supine: Other (comment) (Not observed, pt retired to bedside chair at session end.)  Scooting: Minimum assistance (To scoot hips out towards EOB; Jenny provided to advance R hip, as pt continued to only advance L hip when cued. Pt limited d/t decreased AROM to R shoulder & pain)    Balance  Sitting: High guard  Standing: With support (Jenny/CGA w/ RW; standing tolerance < 5 minutes)    Transfer Training  Transfer Training: Yes  Overall Level of Assistance: Minimum assistance; Additional time (Jenny w/ RW for mutiple ADL transfers. Pt cued for RW safety & correct hand placemet. Pt demo'd Good use of grab bar in bathroom, however grabbed onto front bar of RW w/ R hand and stated she was unable to reach back to  d/t limited motion.)  Interventions: Safety awareness training; Tactile cues; Verbal cues  Sit to Stand: Minimum assistance; Additional time  Stand to Sit: Minimum assistance; Additional time  Bed to Chair: Minimum assistance; Additional time  Toilet Transfer: Minimum assistance (x 3 STS transfers to/from toilet surface this session to engage in toileting tasks & UB bathing/dressing tasks, including doffing/donning abd binder)    Functional Mobility  Overall Level of Assistance: Minimum assistance; Additional time (Jenny to complete functional mobility, EOB>toilet and then toilet>sink>chair, w/ RW. Pt took very short, shuffled steps & c/o pain in R foot during mobility. Pt cued to keep ALFONZO within RW for upright posture. Needed EXTENDED time for all mobility tasks.)  Interventions: Safety awareness training;Verbal cues; Tactile cues  Assistive Device: Corwin Owen rolling;Gait belt       ADL  Feeding: Setup  Feeding Skilled Clinical Factors: Set up assist for items on breakfast tray at session end  Grooming: Minimal assistance  Grooming Skilled Clinical Factors: Jenny for hair grooming tasks from seated position, as pt needed assist w/ 25% of posterior hair; CGA to stand at room sink and complete oral care & facial hygiene  UE Bathing: Moderate assistance;Maximum assistance  UE Bathing Skilled Clinical Factors: For UB bathing tasks from a seated position w/ use of CHG soap. Pt self limiting at times, telling writer \"they always do it for me\" or that RUE is limiting participation. Pt cued to wash body w/ LUE as much as tolerated, then Mod-MaxA provided for thoroughness  UE Dressing: Maximum assistance  UE Dressing Skilled Clinical Factors: MaxA to don/doff abd binder; then [address] for [facility] gown & MaxA for donning clean gown  LE Dressing: Maximum assistance  LE Dressing Skilled Clinical Factors: MaxA to doff brief and then MaxA to don clean pullup brief; Pt able to assist w/ managing clothing up over hips from a standing position  Toileting: Maximum assistance  Toileting Skilled Clinical Factors: For clothing mgnt & personal hygiene post BM  Additional Comments: Pt's ADL performance continues to be limited d/t generalized weakness, decreased functional activity tolearnce, fatigue, pain, and decreased cognition. Pt needed cues throughout entire session to increased partcipation in tasks vs having functional tasks performed for pt. Reviewed education abd precautions and use of abd binder when up OOB, w/ pt verbalizing Fair return. Facilitated total body ADLs w/ pt, w/ pt continuing to required MaxA for UB/LB ADLs at this time. Safety Devices  Type of Devices: All fall risk precautions in place;Call light within reach; Chair alarm in place;Gait belt;Nurse notified; Left in chair  Restraints  Restraints Initially in Place: No     Patient Education  Education Given To:

## 2023-10-09 NOTE — PROGRESS NOTES
Physical Therapy  Facility/Department: [facility] MED SURG  Physical Therapy Treatment Note    Name: Grupo Krishna  : 1935  MRN: [id number]  Date of Service: 10/9/2023    Discharge Recommendations:  Patient would benefit from continued therapy after discharge          Patient Diagnosis(es): The primary encounter diagnosis was Acute postoperative pain. A diagnosis of Colonic mass was also pertinent to this visit. Past Medical History:  has a past medical history of Anemia, Arthritis, Cancer ([address]), DDD (degenerative disc disease), lumbar, Diabetes mellitus ([address]), History of blood transfusion, Hyperlipidemia, Hypertension, Scoliosis, Shoulder arthritis, TIA (transient ischemic attack), and Walker as ambulation aid. Past Surgical History:  has a past surgical history that includes eye surgery; Cataract removal; joint replacement (Bilateral); Femur fracture surgery (Left); Hip fracture surgery (Right); Cholecystectomy; Tonsillectomy; Arm Surgery (Right); and hemicolectomy (Right, 10/4/2023). Assessment   Body Structures, Functions, Activity Limitations Requiring Skilled Therapeutic Intervention: Decreased functional mobility ; Decreased ADL status; Decreased strength;Decreased safe awareness;Decreased cognition;Decreased endurance;Decreased balance;Decreased high-level IADLs; Increased pain;Decreased posture;Decreased coordination  Assessment: Pt with MOD deficits of bed mobility, transfers, ambulation, balance, cognition, posture, safety awareness and endurance this session. Pt still INC risk for falls & requires continued PT to maximize independence with functional mobility, balance, safety awareness & activity tolerance to improve overall tolerance of ADL's.   Pt currently functioning below baseline with acute change of functional status s/p open R hemicolectomy with mobilization of hepatic flexure & lysis adhesions with AM-PAC mobility score of , and recommend daily inpatient skilled therapy at time of discharge gait with device to facilitate activity tolerance to [facility] & reduce post op complications  Short Term Goal 5: Ed pt on home ex's, safety, balance & endurance training, pressure relief with Pt able to demonstrate effective pressure relief techniques, ABD precautions, fall prevention, & issue written Pt Ed  Patient Goals   Patient Goals : To get better       Education  Patient Education  Education Given To: Patient  Education Provided: Role of Therapy; Fall Prevention Strategies;Transfer Training;Equipment; Energy Conservation;Precautions  Education Provided Comments: purpose of cont'd PT & mobility progression s/p ABD surgery, safety awareness, fall risk prevention, safe transfers & ambulation w/ RW, circulation ex's, pressure relief and breathing techniques, prevention of sedentary complications, and PT POC. Pt demonstrated FAIR carryover  Pt requires continued reinforcement of education. Education Method: Demonstration;Verbal  Barriers to Learning: Cognition; Hearing  Education Outcome: Verbalized understanding;Continued education needed      Therapy Time   Individual Concurrent Group Co-treatment   Time In 1113 (1512)         Time Out 1202 (1526)         Minutes 49+14=63               Treatment time: 63 minutes    [address] [place] [address] Nw, PT

## 2023-10-09 NOTE — DISCHARGE SUMMARY
Surgery Discharge Summary       Patient Identification  Julienne Floyd is a 81 y.o. female. :  1935  Admit Date:  10/4/2023    Discharge date:   No discharge date for patient encounter. Disposition: [place] house    Discharge Diagnoses:   Patient Active Problem List   Diagnosis    Colonic mass     Condition on discharge: good    Consults: none    Surgery: Open right hemicolectomy, mobilization of the hepatic flexure, lysis of adhesions, omentopexy, TAP block (10/4/23)    Patient Instructions: Activity: no heavy lifting, pushing, pulling for 6 weeks, no driving for 2 weeks or while on analgesics  Diet: As tolerated  Follow-up with Dr. Tami Grullon in 7-10 days weeks. See pre-printed instructions in chart and given to patient upon discharge. Discharge Medications:      Medication List        CHANGE how you take these medications      traMADol 50 MG tablet  Commonly known as: ULTRAM  Take 1 tablet by mouth every 12 hours as needed for Pain (pain scale 7-10) for up to 5 days. Max Daily Amount: 100 mg  What changed: See the new instructions.             CONTINUE taking these medications      amLODIPine 5 MG tablet  Commonly known as: NORVASC     atorvastatin 10 MG tablet  Commonly known as: LIPITOR     CALCIUM + VITAMIN D3 PO     docusate sodium 100 MG capsule  Commonly known as: COLACE     furosemide 40 MG tablet  Commonly known as: LASIX     lisinopril 20 MG tablet  Commonly known as: PRINIVIL;ZESTRIL     metoprolol tartrate 25 MG tablet  Commonly known as: LOPRESSOR     omeprazole 20 MG delayed release capsule  Commonly known as: PRILOSEC     Prolia 60 MG/ML Sosy SC injection  Generic drug: denosumab            STOP taking these medications      HYDROcodone-acetaminophen 5-325 MG per tablet  Commonly known as: Marsa Roof               Where to Get Your Medications        These medications were sent to [id number], [address] - F [phone]

## 2023-10-09 NOTE — CARE COORDINATION
[facility] has received auth and can admit today. [facility] will transport at [facility]. Orders faxed. Nurse to call report to [phone]. Discussed with patient advocate. She and son are agreeable with dc plans. HENS completed. IMM letter provided to patient. Patient offered four hours to make informed decision regarding appeal process; patient agreeable to discharge.

## 2023-10-11 LAB — SURGICAL PATHOLOGY REPORT: NORMAL

## 2023-10-12 ENCOUNTER — HOSPITAL ENCOUNTER (OUTPATIENT)
Age: 88
Setting detail: SPECIMEN
Discharge: HOME | End: 2023-10-12

## 2023-10-12 LAB
ANION GAP SERPL CALCULATED.3IONS-SCNC: 8 MMOL/L (ref 9–17)
BUN SERPL-MCNC: 16 MG/DL (ref 8–23)
BUN/CREAT SERPL: 20 (ref 9–20)
CALCIUM SERPL-MCNC: 8.3 MG/DL (ref 8.6–10.4)
CHLORIDE SERPL-SCNC: 105 MMOL/L (ref 98–107)
CO2 SERPL-SCNC: 27 MMOL/L (ref 20–31)
CREAT SERPL-MCNC: 0.8 MG/DL (ref 0.5–0.9)
ERYTHROCYTE [DISTWIDTH] IN BLOOD BY AUTOMATED COUNT: 24 % (ref 11.8–14.4)
GFR SERPL CREATININE-BSD FRML MDRD: >60 ML/MIN/1.73M2
GLUCOSE SERPL-MCNC: 102 MG/DL (ref 70–99)
HCT VFR BLD AUTO: 28.8 % (ref 36.3–47.1)
HGB BLD-MCNC: 8.4 G/DL (ref 11.9–15.1)
MCH RBC QN AUTO: 24.9 PG (ref 25.2–33.5)
MCHC RBC AUTO-ENTMCNC: 29.2 G/DL (ref 28.4–34.8)
MCV RBC AUTO: 85.5 FL (ref 82.6–102.9)
NRBC BLD-RTO: 0 PER 100 WBC
PLATELET # BLD AUTO: 326 K/UL (ref 138–453)
PMV BLD AUTO: 10.5 FL (ref 8.1–13.5)
POTASSIUM SERPL-SCNC: 3.8 MMOL/L (ref 3.7–5.3)
RBC # BLD AUTO: 3.37 M/UL (ref 3.95–5.11)
SODIUM SERPL-SCNC: 140 MMOL/L (ref 135–144)
WBC OTHER # BLD: 7.8 K/UL (ref 3.5–11.3)

## 2023-10-12 PROCEDURE — P9603 ONE-WAY ALLOW PRORATED MILES: HCPCS

## 2023-10-12 PROCEDURE — 85027 COMPLETE CBC AUTOMATED: CPT

## 2023-10-12 PROCEDURE — 36415 COLL VENOUS BLD VENIPUNCTURE: CPT

## 2023-10-12 PROCEDURE — 80048 BASIC METABOLIC PNL TOTAL CA: CPT

## 2023-10-19 ENCOUNTER — HOSPITAL ENCOUNTER (OUTPATIENT)
Age: 88
Setting detail: SPECIMEN
Discharge: HOME | End: 2023-10-19

## 2023-10-19 LAB
HCT VFR BLD AUTO: 29.6 % (ref 36.3–47.1)
HGB BLD-MCNC: 8.8 G/DL (ref 11.9–15.1)

## 2023-10-19 PROCEDURE — 85018 HEMOGLOBIN: CPT

## 2023-10-19 PROCEDURE — 85014 HEMATOCRIT: CPT

## 2023-10-19 PROCEDURE — P9603 ONE-WAY ALLOW PRORATED MILES: HCPCS

## 2023-10-19 PROCEDURE — 36415 COLL VENOUS BLD VENIPUNCTURE: CPT

## 2023-10-25 ENCOUNTER — HOSPITAL ENCOUNTER (OUTPATIENT)
Age: 88
Setting detail: SPECIMEN
Discharge: HOME | End: 2023-10-25

## 2023-10-25 LAB
HCT VFR BLD AUTO: 32.6 % (ref 36.3–47.1)
HGB BLD-MCNC: 9.7 G/DL (ref 11.9–15.1)

## 2023-10-25 PROCEDURE — 85018 HEMOGLOBIN: CPT

## 2023-10-25 PROCEDURE — P9603 ONE-WAY ALLOW PRORATED MILES: HCPCS

## 2023-10-25 PROCEDURE — 36415 COLL VENOUS BLD VENIPUNCTURE: CPT

## 2023-10-25 PROCEDURE — 85014 HEMATOCRIT: CPT

## 2024-04-15 ENCOUNTER — HOSPITAL ENCOUNTER (OUTPATIENT)
Dept: PREADMISSION TESTING | Age: 89
Discharge: HOME | End: 2024-04-15

## 2024-04-18 ENCOUNTER — HOSPITAL ENCOUNTER (OUTPATIENT)
Dept: PREADMISSION TESTING | Age: 89
Discharge: HOME | End: 2024-04-18
Payer: MEDICARE

## 2024-04-18 VITALS
WEIGHT: 143 LBS | RESPIRATION RATE: 16 BRPM | HEART RATE: 80 BPM | BODY MASS INDEX: 28.07 KG/M2 | SYSTOLIC BLOOD PRESSURE: 135 MMHG | HEIGHT: 60 IN | DIASTOLIC BLOOD PRESSURE: 60 MMHG | TEMPERATURE: 97.1 F | OXYGEN SATURATION: 94 %

## 2024-04-18 LAB
ANION GAP SERPL CALCULATED.3IONS-SCNC: 12 MMOL/L (ref 9–17)
BUN SERPL-MCNC: 19 MG/DL (ref 8–23)
BUN/CREAT SERPL: 21 (ref 9–20)
CALCIUM SERPL-MCNC: 9.4 MG/DL (ref 8.6–10.4)
CHLORIDE SERPL-SCNC: 100 MMOL/L (ref 98–107)
CO2 SERPL-SCNC: 24 MMOL/L (ref 20–31)
CREAT SERPL-MCNC: 0.9 MG/DL (ref 0.5–0.9)
ERYTHROCYTE [DISTWIDTH] IN BLOOD BY AUTOMATED COUNT: 12.5 % (ref 11.8–14.4)
GFR SERPL CREATININE-BSD FRML MDRD: 61 ML/MIN/1.73M2
GLUCOSE SERPL-MCNC: 139 MG/DL (ref 70–99)
HCT VFR BLD AUTO: 36.9 % (ref 36.3–47.1)
HGB BLD-MCNC: 12 G/DL (ref 11.9–15.1)
MCH RBC QN AUTO: 31.3 PG (ref 25.2–33.5)
MCHC RBC AUTO-ENTMCNC: 32.5 G/DL (ref 28.4–34.8)
MCV RBC AUTO: 96.3 FL (ref 82.6–102.9)
NRBC BLD-RTO: 0 PER 100 WBC
PLATELET # BLD AUTO: 229 K/UL (ref 138–453)
PMV BLD AUTO: 11.5 FL (ref 8.1–13.5)
POTASSIUM SERPL-SCNC: 3.7 MMOL/L (ref 3.7–5.3)
RBC # BLD AUTO: 3.83 M/UL (ref 3.95–5.11)
SODIUM SERPL-SCNC: 136 MMOL/L (ref 135–144)
WBC OTHER # BLD: 9.8 K/UL (ref 3.5–11.3)

## 2024-04-18 PROCEDURE — 80048 BASIC METABOLIC PNL TOTAL CA: CPT

## 2024-04-18 PROCEDURE — 93005 ELECTROCARDIOGRAM TRACING: CPT | Performed by: ANESTHESIOLOGY

## 2024-04-18 PROCEDURE — 85027 COMPLETE CBC AUTOMATED: CPT

## 2024-04-18 PROCEDURE — 36415 COLL VENOUS BLD VENIPUNCTURE: CPT

## 2024-04-18 PROCEDURE — 87641 MR-STAPH DNA AMP PROBE: CPT

## 2024-04-18 RX ORDER — MELOXICAM 7.5 MG/1
7.5 TABLET ORAL DAILY
COMMUNITY

## 2024-04-18 RX ORDER — FLUTICASONE PROPIONATE 50 MCG
1 SPRAY, SUSPENSION (ML) NASAL DAILY
COMMUNITY

## 2024-04-18 RX ORDER — DULOXETIN HYDROCHLORIDE 20 MG/1
20 CAPSULE, DELAYED RELEASE ORAL
COMMUNITY

## 2024-04-18 RX ORDER — ASPIRIN 81 MG/1
81 TABLET ORAL DAILY
COMMUNITY

## 2024-04-18 RX ORDER — UBIDECARENONE 30 MG
30 CAPSULE ORAL DAILY
COMMUNITY

## 2024-04-18 RX ORDER — ACETAMINOPHEN 325 MG/1
650 TABLET ORAL EVERY 6 HOURS PRN
COMMUNITY

## 2024-04-18 RX ORDER — TRAMADOL HYDROCHLORIDE 50 MG/1
50 TABLET ORAL EVERY 8 HOURS PRN
COMMUNITY

## 2024-04-18 RX ORDER — ERGOCALCIFEROL 1.25 MG/1
50000 CAPSULE ORAL WEEKLY
COMMUNITY

## 2024-04-18 ASSESSMENT — PAIN SCALES - GENERAL: PAINLEVEL_OUTOF10: 2

## 2024-04-18 ASSESSMENT — PAIN DESCRIPTION - PAIN TYPE: TYPE: CHRONIC PAIN

## 2024-04-18 ASSESSMENT — PAIN DESCRIPTION - LOCATION: LOCATION: SHOULDER;BACK

## 2024-04-18 ASSESSMENT — PAIN DESCRIPTION - FREQUENCY: FREQUENCY: CONTINUOUS

## 2024-04-18 ASSESSMENT — PAIN DESCRIPTION - DESCRIPTORS: DESCRIPTORS: ACHING;SHARP;STABBING;THROBBING

## 2024-04-18 ASSESSMENT — PAIN DESCRIPTION - ORIENTATION: ORIENTATION: RIGHT

## 2024-04-18 NOTE — PROGRESS NOTES
PAT Progress Note    Pt Name: Dori Griffiths  MRN: [id number]  YOB: 1935  Date of evaluation: 4/18/2024      [x] Called to PAT. I spoke to the patient, Dori Griffiths, a 89 y.o. female, who is scheduled for an upcoming RIGHT SHOULDER TOTAL ARTHROPLASTY REVERSE by Jomar Randall MD for Other specific arthropathies, not elsewhere classified, right shoulder on 05/02/2024 at 0900.     [x] I reviewed the hard copy rheumatology progress note by Dr. Milligan dated 04/11/2024, placed in paper chart for an Interval History and Physical Note the day of surgery.     Known hyperlipidemia, hypertension, TIA 2020, colon caner, iron deficiency anemia, diabetes. Patient has new onset edema with weight gain of 8 pounds since January. PCP ordered 40 mg Lasix daily yesterday. Per advocate with patient, she feels that edema is already starting to improve. Instructed patient to monitor weight and reach out to Dr. Vivar for further evaluation and management.    Functional Capacity per pt:  1) Pt is not able to walk 2 city blocks on level ground without SOB.  2) Pt is not able to climb 2 flights of stairs without SOB.  3) Pt is not able to walk up a hill for 1-2 city blocks without SOB.    Vital signs: /60   Pulse 80   Temp 97.1 °F (36.2 °C)   Resp 16   Ht 1.524 m (5')   Wt 64.9 kg (143 lb)   SpO2 94%   BMI 27.93 kg/m²     Physical Exam:     General Appearance:  Alert, well appearing, and in no acute distress. Frail-appearing  Mental status: Oriented to person, place, and time.  Lungs: Bilateral equal air entry, clear to auscultation, no wheezing, rales or rhonchi, and normal effort. No cough.   Cardiovascular: Normal rate, regular rhythm, no murmur, gallop, or rub.  Abdomen: Soft, nontender, nondistended, and active bowel sounds.   Extremities: R hand swelling +1. LLE pitting edema +3, RLE pitting edema +2. Radial and pedal pulses palpable bilaterally. No calf tenderness bilaterally.       Past Medical History:

## 2024-04-18 NOTE — PRE-PROCEDURE INSTRUCTIONS
On the Day of Your Surgery, Thursday, 5/2/24, Please Arrive At 7:00 AM     Enter the hospital through the Main Entrance, take the lobby elevators to the second floor and check in at the Surgery Registration desk.     Continue to take your home medications as you normally do up to and including the night before surgery with the exception of blood thinning medications.    Blood Thinning Medications:  Please stop prescription blood thinning medications such as Apixaban (Eliquis); Clopidogrel (Plavix); Dabigatran (Pradaxa); Prasugrel (Effient); Rivaroxaban (Xarelto); Ticagrelor (Brilinta); Warfarin (Coumadin) only as directed by your surgeon and/or the prescribing physician    Some common examples of other medications that can thin your blood are: Aspirin, Ibuprofen (Advil, Motrin), Naproxen (Aleve), Meloxicam (Mobic), Celecoxib (Celebrex), Fish Oil, many Herbal Supplements.  These medications should usually be stopped at least 7 days prior to surgery.    Meloxicam, CoQ10, docosahexaeonic acid. Check with prescribing Dr regarding baby aspirin and when to stop safely prior to procedure     Tylenol is OK to take for pain the week prior to surgery.    Failure to stop certain medications may interfere with your scheduled surgery.    If you receive instructions from your surgeon regarding what medications to stop prior to surgery, please follow those specific instructions.    If You Have Diabetes:  Do not take any of your diabetic medications, (injectables or by mouth) the morning of surgery unless otherwise instructed by the doctor who manages your diabetes. If you are taking insulin, contact the doctor the manages your diabetes for instructions about any changes to your insulin dosages the day before surgery.      Please take the following medication(s) the day of surgery with small sips of water:              Metoprolol, tramadol andyou can use your fluticasone if needed. The patient takes amlodipine at bedtime    Please

## 2024-04-19 LAB
EKG ATRIAL RATE: 80 BPM
EKG P AXIS: 25 DEGREES
EKG P-R INTERVAL: 152 MS
EKG Q-T INTERVAL: 370 MS
EKG QRS DURATION: 74 MS
EKG QTC CALCULATION (BAZETT): 426 MS
EKG R AXIS: -28 DEGREES
EKG T AXIS: 27 DEGREES
EKG VENTRICULAR RATE: 80 BPM
MRSA, DNA, NASAL: NEGATIVE
SPECIMEN DESCRIPTION: NORMAL

## 2024-04-19 RX ORDER — GABAPENTIN 300 MG/1
300 CAPSULE ORAL ONCE
OUTPATIENT
Start: 2024-05-02

## 2024-04-19 RX ORDER — CELECOXIB 200 MG/1
200 CAPSULE ORAL ONCE
OUTPATIENT
Start: 2024-05-02

## 2024-04-19 RX ORDER — ACETAMINOPHEN 500 MG
1000 TABLET ORAL ONCE
OUTPATIENT
Start: 2024-05-02

## 2024-04-19 NOTE — PERIOP NOTE
Dr. Mckinley reviewed H&P, EKG, and functional capacity. Cardiac clearance is on the chart. No further intervention needed at this time.

## 2024-05-01 NOTE — DISCHARGE INSTRUCTIONS
Keep it Clean - Post-Operative Home instructions    These instructions are to help you have the best possible recovery after your surgical procedure. [facility] is here to support you. If you have questions, call [phone] Monday through Friday from 7:30AM to 8:30PM to speak to a nurse. If you need to speak to someone outside of these hours, call your physician.     Incision Do’s and Don’ts  Do wash hands before and after dressing changes or when you have had any contact with your incision. Use hand  or antibacterial soap.  Do keep your incision clean and dry. It’s OK to wash the skin around your incision with mild soap and water.  Do change your dressing as you were told.   Do notify your doctor if the dressing becomes wet or dirty.  Do use a clean washcloth every time when cleaning your incision.   Do sleep on clean linens.  Do keep pets away from incision site.  Don’t sit in a bathtub, pool, or hot tub until your incision is fully closed and any drains are removed.  Don’t scrub, pick, scratch, or pull at your incision.  Don’t use oils, lotions, or creams on your incision unless your healthcare provider approves it.    Follow-up  You will have one or more follow-up visits with your healthcare provider. These are needed to check how well you’re healing. Your drain, stitches, or staples may also be removed during these visits. Do not miss your follow-up visit, even if you are feeling better.      Call your healthcare provider right away if you have the following:  Fever of 100.4°F (38°C) or higher, or as advised by your healthcare provider.  Chest pain or trouble breathing.  Pain or tenderness in your leg(s).  Increased pain, redness, swelling, bleeding, or foul-smelling drainage at the incision site.  Incision changes, separates or is hot to the touch.  Problems with the drain if you have one.  Itchy, swollen skin; skin rash.    Medicines, Diet, and Activity:   Refer to your discharge paperwork for further

## 2024-05-01 NOTE — H&P
Interval H&P Note    Pt Name: Dori Griffiths  MRN: [id number]  YOB: 1935  Date of evaluation: 5/2/2024      [x] I have reviewed in [facility] the hardcopy Rheumatology Progress Note by Dr. Milligan dated 04/11/2024  labeled in the short chart for the Interval History and Physical note.     [x] I have examined  Dori Griffiths, a 89 y.o. female with known HTN, HLD, remote  Fe deficiency anemia, DM and colon cancer managed by PCP and several specialists who arrived today for her scheduled for RIGHT SHOULDER TOTAL ARTHROPLASTY REVERSE by Jomar Randall MD for Rotator cuff arthropathy, right. The patient denies new health changes, fever, chills, wheezing, cough, increased SOB, chest pain, open sores or wounds.  Last ASA 81mg 4/26/24  Mobic and CoQ10 5/1/24    Krystyna Rodgers, RN, Periop Note      Signed  Date of Service: 4/19/2024  4:15 PM   Related encounter: Pre-Admission Testing Visit 30 min from 4/18/2024 in [facility] PRE-ADMIT TESTING  \"Dr. Mckinley reviewed H&P, EKG, and functional capacity. Cardiac clearance is on the chart. No further intervention needed at this time.\"    Cardiac Clearance by Dr Cadet 4/19/24    Patient evaluated 5/1/24 by PCP Dr Weinstein who gave the patient additional dose of water pill per patient advocate.     Vital signs: BP (!) 154/84   Pulse 73   Temp 97.3 °F (36.3 °C)   Resp 20   SpO2 98%     Allergies:  Patient has no known allergies.    Medications:    Prior to Admission medications    Medication Sig Start Date End Date Taking? Authorizing Provider   psyllium (KONSYL) 28.3 % PACK Take 1 packet by mouth daily    Libra Mata MD   aspirin 81 MG EC tablet Take 1 tablet by mouth daily    Libra Mata MD   co-enzyme Q-10 30 MG CAPS capsule Take 1 capsule by mouth daily    Libra Mata MD   DOCOSAHEXAENOIC ACID PO Take 1 tablet by mouth daily    Libra Mata MD   ergocalciferol (ERGOCALCIFEROL) 1.25 MG (68422 UT) capsule Take 1 capsule by mouth once a      Labs:  Recent Labs     04/29/24  1152 04/18/24  1537   HGB 12.0 12.0   HCT 37.3 36.9   WBC 9.59 9.8   MCV 96.4 96.3    229    136   K 4.2 3.7    100   CO2 27 24   BUN  --  19   CREATININE 1.02 0.9   GLUCOSE 105* 139*       No results for input(s): \"COVID19\" in the last 720 hours.    Talya Pulido, APRN - CNP  Electronically signed 5/2/2024 at 6:21 AM

## 2024-05-02 ENCOUNTER — HOSPITAL ENCOUNTER (OUTPATIENT)
Age: 89
Discharge: HOME | End: 2024-05-03
Attending: ORTHOPAEDIC SURGERY | Admitting: ORTHOPAEDIC SURGERY
Payer: MEDICARE

## 2024-05-02 ENCOUNTER — ANESTHESIA EVENT (OUTPATIENT)
Dept: OPERATING ROOM | Age: 89
End: 2024-05-02
Payer: MEDICARE

## 2024-05-02 ENCOUNTER — ANESTHESIA (OUTPATIENT)
Dept: OPERATING ROOM | Age: 89
End: 2024-05-02
Payer: MEDICARE

## 2024-05-02 DIAGNOSIS — M12.811 ROTATOR CUFF ARTHROPATHY OF RIGHT SHOULDER: ICD-10-CM

## 2024-05-02 DIAGNOSIS — G89.18 ACUTE POSTOPERATIVE PAIN: Primary | ICD-10-CM

## 2024-05-02 PROBLEM — I10 HYPERTENSION: Status: ACTIVE | Noted: 2024-05-02

## 2024-05-02 PROBLEM — E11.9 DIABETES MELLITUS (HCC): Status: ACTIVE | Noted: 2024-05-02

## 2024-05-02 PROBLEM — E11.29 TYPE 2 DIABETES MELLITUS WITH KIDNEY COMPLICATION, WITHOUT LONG-TERM CURRENT USE OF INSULIN (HCC): Status: ACTIVE | Noted: 2024-05-02

## 2024-05-02 PROBLEM — N18.31 STAGE 3A CHRONIC KIDNEY DISEASE (HCC): Status: ACTIVE | Noted: 2024-05-02

## 2024-05-02 LAB
GLUCOSE BLD-MCNC: 244 MG/DL (ref 65–105)
GLUCOSE BLD-MCNC: 246 MG/DL (ref 65–105)

## 2024-05-02 PROCEDURE — 6360000002 HC RX W HCPCS: Performed by: ORTHOPAEDIC SURGERY

## 2024-05-02 PROCEDURE — 7100000001 HC PACU RECOVERY - ADDTL 15 MIN: Performed by: ORTHOPAEDIC SURGERY

## 2024-05-02 PROCEDURE — 6370000000 HC RX 637 (ALT 250 FOR IP): Performed by: INTERNAL MEDICINE

## 2024-05-02 PROCEDURE — 97530 THERAPEUTIC ACTIVITIES: CPT

## 2024-05-02 PROCEDURE — 3700000000 HC ANESTHESIA ATTENDED CARE: Performed by: ORTHOPAEDIC SURGERY

## 2024-05-02 PROCEDURE — 2709999900 HC NON-CHARGEABLE SUPPLY: Performed by: ORTHOPAEDIC SURGERY

## 2024-05-02 PROCEDURE — 99222 1ST HOSP IP/OBS MODERATE 55: CPT | Performed by: INTERNAL MEDICINE

## 2024-05-02 PROCEDURE — 2580000003 HC RX 258: Performed by: ANESTHESIOLOGY

## 2024-05-02 PROCEDURE — C1776 JOINT DEVICE (IMPLANTABLE): HCPCS | Performed by: ORTHOPAEDIC SURGERY

## 2024-05-02 PROCEDURE — 6370000000 HC RX 637 (ALT 250 FOR IP): Performed by: ORTHOPAEDIC SURGERY

## 2024-05-02 PROCEDURE — 6360000002 HC RX W HCPCS: Performed by: ANESTHESIOLOGY

## 2024-05-02 PROCEDURE — 97535 SELF CARE MNGMENT TRAINING: CPT

## 2024-05-02 PROCEDURE — C1713 ANCHOR/SCREW BN/BN,TIS/BN: HCPCS | Performed by: ORTHOPAEDIC SURGERY

## 2024-05-02 PROCEDURE — 97162 PT EVAL MOD COMPLEX 30 MIN: CPT

## 2024-05-02 PROCEDURE — 3700000001 HC ADD 15 MINUTES (ANESTHESIA): Performed by: ORTHOPAEDIC SURGERY

## 2024-05-02 PROCEDURE — 7100000000 HC PACU RECOVERY - FIRST 15 MIN: Performed by: ORTHOPAEDIC SURGERY

## 2024-05-02 PROCEDURE — 3600000005 HC SURGERY LEVEL 5 BASE: Performed by: ORTHOPAEDIC SURGERY

## 2024-05-02 PROCEDURE — 97166 OT EVAL MOD COMPLEX 45 MIN: CPT

## 2024-05-02 PROCEDURE — 2720000010 HC SURG SUPPLY STERILE: Performed by: ORTHOPAEDIC SURGERY

## 2024-05-02 PROCEDURE — 2580000003 HC RX 258: Performed by: ORTHOPAEDIC SURGERY

## 2024-05-02 PROCEDURE — 82947 ASSAY GLUCOSE BLOOD QUANT: CPT

## 2024-05-02 PROCEDURE — 2500000003 HC RX 250 WO HCPCS: Performed by: NURSE ANESTHETIST, CERTIFIED REGISTERED

## 2024-05-02 PROCEDURE — 3600000015 HC SURGERY LEVEL 5 ADDTL 15MIN: Performed by: ORTHOPAEDIC SURGERY

## 2024-05-02 PROCEDURE — 6360000002 HC RX W HCPCS: Performed by: NURSE ANESTHETIST, CERTIFIED REGISTERED

## 2024-05-02 DEVICE — IMPLANTABLE DEVICE: Type: IMPLANTABLE DEVICE | Site: SHOULDER | Status: FUNCTIONAL

## 2024-05-02 DEVICE — IMPLANTABLE DEVICE
Type: IMPLANTABLE DEVICE | Site: SHOULDER | Status: FUNCTIONAL
Brand: COMPREHENSIVE® REVERSE SHOULDER

## 2024-05-02 DEVICE — IMPLANT HMRL TY +3 STD: Type: IMPLANTABLE DEVICE | Site: SHOULDER | Status: FUNCTIONAL

## 2024-05-02 DEVICE — IMPLANTABLE DEVICE
Type: IMPLANTABLE DEVICE | Site: SHOULDER | Status: FUNCTIONAL
Brand: BIOMET® BONE CEMENT R

## 2024-05-02 DEVICE — IMPLANTABLE DEVICE
Type: IMPLANTABLE DEVICE | Site: SHOULDER | Status: FUNCTIONAL
Brand: COMPREHENSIVE® SHOULDER SYSTEM

## 2024-05-02 DEVICE — SPHERE GLEN DIA36MM REG STD CO CHROM TAPR FIT FOR COMPHSVE: Type: IMPLANTABLE DEVICE | Site: SHOULDER | Status: FUNCTIONAL

## 2024-05-02 DEVICE — HMRL BEARING 36 MM STD VITE: Type: IMPLANTABLE DEVICE | Site: SHOULDER | Status: FUNCTIONAL

## 2024-05-02 DEVICE — PIN FIX L60MM DIA3MM STD S STL THRD SGL SHRP FOR HUM RESECT: Type: IMPLANTABLE DEVICE | Site: SHOULDER | Status: FUNCTIONAL

## 2024-05-02 RX ORDER — FLUTICASONE PROPIONATE 50 MCG
1 SPRAY, SUSPENSION (ML) NASAL DAILY
Status: DISCONTINUED | OUTPATIENT
Start: 2024-05-02 | End: 2024-05-03 | Stop reason: HOSPADM

## 2024-05-02 RX ORDER — ONDANSETRON 2 MG/ML
4 INJECTION INTRAMUSCULAR; INTRAVENOUS
Status: DISCONTINUED | OUTPATIENT
Start: 2024-05-02 | End: 2024-05-02 | Stop reason: HOSPADM

## 2024-05-02 RX ORDER — ACETAMINOPHEN 325 MG/1
650 TABLET ORAL EVERY 6 HOURS
Status: DISCONTINUED | OUTPATIENT
Start: 2024-05-02 | End: 2024-05-03 | Stop reason: HOSPADM

## 2024-05-02 RX ORDER — ACETAMINOPHEN 500 MG
1000 TABLET ORAL 3 TIMES DAILY PRN
Qty: 84 TABLET | Refills: 0 | Status: SHIPPED | OUTPATIENT
Start: 2024-05-02 | End: 2024-05-16

## 2024-05-02 RX ORDER — UBIDECARENONE 30 MG
30 CAPSULE ORAL DAILY
Status: DISCONTINUED | OUTPATIENT
Start: 2024-05-02 | End: 2024-05-02

## 2024-05-02 RX ORDER — SODIUM CHLORIDE 9 MG/ML
INJECTION, SOLUTION INTRAVENOUS CONTINUOUS
Status: DISCONTINUED | OUTPATIENT
Start: 2024-05-02 | End: 2024-05-02

## 2024-05-02 RX ORDER — TRAMADOL HYDROCHLORIDE 50 MG/1
50 TABLET ORAL EVERY 6 HOURS PRN
Status: DISCONTINUED | OUTPATIENT
Start: 2024-05-02 | End: 2024-05-03 | Stop reason: HOSPADM

## 2024-05-02 RX ORDER — TRAMADOL HYDROCHLORIDE 50 MG/1
50 TABLET ORAL EVERY 6 HOURS PRN
Qty: 28 TABLET | Refills: 0 | Status: SHIPPED | OUTPATIENT
Start: 2024-05-02 | End: 2024-05-02

## 2024-05-02 RX ORDER — DEXAMETHASONE SODIUM PHOSPHATE 10 MG/ML
INJECTION, SOLUTION INTRAMUSCULAR; INTRAVENOUS PRN
Status: DISCONTINUED | OUTPATIENT
Start: 2024-05-02 | End: 2024-05-02 | Stop reason: SDUPTHER

## 2024-05-02 RX ORDER — DEXTROSE MONOHYDRATE 100 MG/ML
INJECTION, SOLUTION INTRAVENOUS CONTINUOUS PRN
Status: DISCONTINUED | OUTPATIENT
Start: 2024-05-02 | End: 2024-05-03 | Stop reason: HOSPADM

## 2024-05-02 RX ORDER — ACETAMINOPHEN 325 MG/1
650 TABLET ORAL EVERY 6 HOURS PRN
Status: DISCONTINUED | OUTPATIENT
Start: 2024-05-02 | End: 2024-05-02 | Stop reason: SDUPTHER

## 2024-05-02 RX ORDER — DULOXETIN HYDROCHLORIDE 20 MG/1
20 CAPSULE, DELAYED RELEASE ORAL
Status: DISCONTINUED | OUTPATIENT
Start: 2024-05-02 | End: 2024-05-03 | Stop reason: HOSPADM

## 2024-05-02 RX ORDER — SODIUM CHLORIDE 0.9 % (FLUSH) 0.9 %
5-40 SYRINGE (ML) INJECTION PRN
Status: DISCONTINUED | OUTPATIENT
Start: 2024-05-02 | End: 2024-05-03 | Stop reason: HOSPADM

## 2024-05-02 RX ORDER — SODIUM CHLORIDE 0.9 % (FLUSH) 0.9 %
5-40 SYRINGE (ML) INJECTION PRN
Status: DISCONTINUED | OUTPATIENT
Start: 2024-05-02 | End: 2024-05-02 | Stop reason: HOSPADM

## 2024-05-02 RX ORDER — GABAPENTIN 100 MG/1
100 CAPSULE ORAL 3 TIMES DAILY
Qty: 21 CAPSULE | Refills: 0 | Status: SHIPPED | OUTPATIENT
Start: 2024-05-02 | End: 2024-05-09

## 2024-05-02 RX ORDER — TRANEXAMIC ACID 100 MG/ML
INJECTION, SOLUTION INTRAVENOUS PRN
Status: DISCONTINUED | OUTPATIENT
Start: 2024-05-02 | End: 2024-05-02 | Stop reason: SDUPTHER

## 2024-05-02 RX ORDER — PROPOFOL 10 MG/ML
INJECTION, EMULSION INTRAVENOUS PRN
Status: DISCONTINUED | OUTPATIENT
Start: 2024-05-02 | End: 2024-05-02 | Stop reason: SDUPTHER

## 2024-05-02 RX ORDER — VANCOMYCIN HYDROCHLORIDE 1 G/20ML
INJECTION, POWDER, LYOPHILIZED, FOR SOLUTION INTRAVENOUS
Status: DISCONTINUED
Start: 2024-05-02 | End: 2024-05-02

## 2024-05-02 RX ORDER — LIDOCAINE HYDROCHLORIDE 20 MG/ML
INJECTION, SOLUTION EPIDURAL; INFILTRATION; INTRACAUDAL; PERINEURAL PRN
Status: DISCONTINUED | OUTPATIENT
Start: 2024-05-02 | End: 2024-05-02 | Stop reason: SDUPTHER

## 2024-05-02 RX ORDER — LIDOCAINE HYDROCHLORIDE 10 MG/ML
1 INJECTION, SOLUTION EPIDURAL; INFILTRATION; INTRACAUDAL; PERINEURAL
Status: DISCONTINUED | OUTPATIENT
Start: 2024-05-03 | End: 2024-05-02 | Stop reason: HOSPADM

## 2024-05-02 RX ORDER — TRAMADOL HYDROCHLORIDE 50 MG/1
100 TABLET ORAL EVERY 6 HOURS PRN
Status: DISCONTINUED | OUTPATIENT
Start: 2024-05-02 | End: 2024-05-03 | Stop reason: HOSPADM

## 2024-05-02 RX ORDER — ONDANSETRON 4 MG/1
4 TABLET, ORALLY DISINTEGRATING ORAL EVERY 8 HOURS PRN
Status: DISCONTINUED | OUTPATIENT
Start: 2024-05-02 | End: 2024-05-03 | Stop reason: HOSPADM

## 2024-05-02 RX ORDER — TRAMADOL HYDROCHLORIDE 50 MG/1
50 TABLET ORAL EVERY 6 HOURS PRN
Qty: 28 TABLET | Refills: 0 | Status: SHIPPED | OUTPATIENT
Start: 2024-05-02 | End: 2024-05-02 | Stop reason: HOSPADM

## 2024-05-02 RX ORDER — INSULIN LISPRO 100 [IU]/ML
0-4 INJECTION, SOLUTION INTRAVENOUS; SUBCUTANEOUS
Status: DISCONTINUED | OUTPATIENT
Start: 2024-05-02 | End: 2024-05-03 | Stop reason: HOSPADM

## 2024-05-02 RX ORDER — ROCURONIUM BROMIDE 10 MG/ML
INJECTION, SOLUTION INTRAVENOUS PRN
Status: DISCONTINUED | OUTPATIENT
Start: 2024-05-02 | End: 2024-05-02 | Stop reason: SDUPTHER

## 2024-05-02 RX ORDER — GABAPENTIN 300 MG/1
300 CAPSULE ORAL ONCE
Status: DISCONTINUED | OUTPATIENT
Start: 2024-05-02 | End: 2024-05-02

## 2024-05-02 RX ORDER — TRAMADOL HYDROCHLORIDE 50 MG/1
50 TABLET ORAL EVERY 6 HOURS PRN
Qty: 28 TABLET | Refills: 0 | Status: SHIPPED | OUTPATIENT
Start: 2024-05-02 | End: 2024-05-09

## 2024-05-02 RX ORDER — ONDANSETRON 2 MG/ML
4 INJECTION INTRAMUSCULAR; INTRAVENOUS EVERY 6 HOURS PRN
Status: DISCONTINUED | OUTPATIENT
Start: 2024-05-02 | End: 2024-05-03 | Stop reason: HOSPADM

## 2024-05-02 RX ORDER — SODIUM CHLORIDE, SODIUM LACTATE, POTASSIUM CHLORIDE, CALCIUM CHLORIDE 600; 310; 30; 20 MG/100ML; MG/100ML; MG/100ML; MG/100ML
INJECTION, SOLUTION INTRAVENOUS CONTINUOUS
Status: DISCONTINUED | OUTPATIENT
Start: 2024-05-02 | End: 2024-05-02 | Stop reason: HOSPADM

## 2024-05-02 RX ORDER — ACETAMINOPHEN 500 MG
1000 TABLET ORAL ONCE
Status: DISCONTINUED | OUTPATIENT
Start: 2024-05-02 | End: 2024-05-02

## 2024-05-02 RX ORDER — CYCLOBENZAPRINE HCL 10 MG
10 TABLET ORAL EVERY 12 HOURS PRN
Status: DISCONTINUED | OUTPATIENT
Start: 2024-05-02 | End: 2024-05-03 | Stop reason: HOSPADM

## 2024-05-02 RX ORDER — FENTANYL CITRATE 50 UG/ML
25 INJECTION, SOLUTION INTRAMUSCULAR; INTRAVENOUS EVERY 5 MIN PRN
Status: DISCONTINUED | OUTPATIENT
Start: 2024-05-02 | End: 2024-05-02 | Stop reason: HOSPADM

## 2024-05-02 RX ORDER — TRAMADOL HYDROCHLORIDE 50 MG/1
50 TABLET ORAL EVERY 8 HOURS PRN
Status: DISCONTINUED | OUTPATIENT
Start: 2024-05-02 | End: 2024-05-02 | Stop reason: SDUPTHER

## 2024-05-02 RX ORDER — SODIUM CHLORIDE 0.9 % (FLUSH) 0.9 %
5-40 SYRINGE (ML) INJECTION EVERY 12 HOURS SCHEDULED
Status: DISCONTINUED | OUTPATIENT
Start: 2024-05-02 | End: 2024-05-02 | Stop reason: HOSPADM

## 2024-05-02 RX ORDER — ATORVASTATIN CALCIUM 10 MG/1
10 TABLET, FILM COATED ORAL DAILY
Status: DISCONTINUED | OUTPATIENT
Start: 2024-05-02 | End: 2024-05-03 | Stop reason: HOSPADM

## 2024-05-02 RX ORDER — HYDROMORPHONE HYDROCHLORIDE 1 MG/ML
0.5 INJECTION, SOLUTION INTRAMUSCULAR; INTRAVENOUS; SUBCUTANEOUS EVERY 5 MIN PRN
Status: DISCONTINUED | OUTPATIENT
Start: 2024-05-02 | End: 2024-05-02 | Stop reason: HOSPADM

## 2024-05-02 RX ORDER — SODIUM CHLORIDE 9 MG/ML
INJECTION, SOLUTION INTRAVENOUS PRN
Status: DISCONTINUED | OUTPATIENT
Start: 2024-05-02 | End: 2024-05-02 | Stop reason: HOSPADM

## 2024-05-02 RX ORDER — HYDROXYZINE HYDROCHLORIDE 10 MG/1
10 TABLET, FILM COATED ORAL EVERY 8 HOURS PRN
Status: DISCONTINUED | OUTPATIENT
Start: 2024-05-02 | End: 2024-05-03 | Stop reason: HOSPADM

## 2024-05-02 RX ORDER — INSULIN LISPRO 100 [IU]/ML
0-4 INJECTION, SOLUTION INTRAVENOUS; SUBCUTANEOUS NIGHTLY
Status: DISCONTINUED | OUTPATIENT
Start: 2024-05-02 | End: 2024-05-03 | Stop reason: HOSPADM

## 2024-05-02 RX ORDER — FUROSEMIDE 40 MG/1
40 TABLET ORAL DAILY
Status: DISCONTINUED | OUTPATIENT
Start: 2024-05-02 | End: 2024-05-03 | Stop reason: HOSPADM

## 2024-05-02 RX ORDER — CELECOXIB 200 MG/1
200 CAPSULE ORAL ONCE
Status: DISCONTINUED | OUTPATIENT
Start: 2024-05-02 | End: 2024-05-02

## 2024-05-02 RX ORDER — ERGOCALCIFEROL 1.25 MG/1
50000 CAPSULE ORAL WEEKLY
Status: DISCONTINUED | OUTPATIENT
Start: 2024-05-08 | End: 2024-05-03 | Stop reason: HOSPADM

## 2024-05-02 RX ORDER — BISACODYL 5 MG/1
5 TABLET, DELAYED RELEASE ORAL DAILY
Status: DISCONTINUED | OUTPATIENT
Start: 2024-05-02 | End: 2024-05-03 | Stop reason: HOSPADM

## 2024-05-02 RX ORDER — SODIUM CHLORIDE 9 MG/ML
INJECTION, SOLUTION INTRAVENOUS PRN
Status: DISCONTINUED | OUTPATIENT
Start: 2024-05-02 | End: 2024-05-03 | Stop reason: HOSPADM

## 2024-05-02 RX ORDER — NALOXONE HYDROCHLORIDE 0.4 MG/ML
INJECTION, SOLUTION INTRAMUSCULAR; INTRAVENOUS; SUBCUTANEOUS PRN
Status: DISCONTINUED | OUTPATIENT
Start: 2024-05-02 | End: 2024-05-02 | Stop reason: HOSPADM

## 2024-05-02 RX ORDER — VANCOMYCIN HYDROCHLORIDE 1 G/20ML
INJECTION, POWDER, LYOPHILIZED, FOR SOLUTION INTRAVENOUS PRN
Status: DISCONTINUED | OUTPATIENT
Start: 2024-05-02 | End: 2024-05-02 | Stop reason: ALTCHOICE

## 2024-05-02 RX ORDER — FENTANYL CITRATE 50 UG/ML
INJECTION, SOLUTION INTRAMUSCULAR; INTRAVENOUS PRN
Status: DISCONTINUED | OUTPATIENT
Start: 2024-05-02 | End: 2024-05-02 | Stop reason: SDUPTHER

## 2024-05-02 RX ORDER — ASPIRIN 81 MG/1
81 TABLET ORAL DAILY
Status: DISCONTINUED | OUTPATIENT
Start: 2024-05-02 | End: 2024-05-03 | Stop reason: HOSPADM

## 2024-05-02 RX ORDER — ONDANSETRON 2 MG/ML
INJECTION INTRAMUSCULAR; INTRAVENOUS PRN
Status: DISCONTINUED | OUTPATIENT
Start: 2024-05-02 | End: 2024-05-02 | Stop reason: SDUPTHER

## 2024-05-02 RX ORDER — AMLODIPINE BESYLATE 5 MG/1
5 TABLET ORAL NIGHTLY
Status: DISCONTINUED | OUTPATIENT
Start: 2024-05-02 | End: 2024-05-03 | Stop reason: HOSPADM

## 2024-05-02 RX ORDER — TRANEXAMIC ACID 100 MG/ML
INJECTION, SOLUTION INTRAVENOUS
Status: COMPLETED
Start: 2024-05-02 | End: 2024-05-02

## 2024-05-02 RX ORDER — SODIUM CHLORIDE 0.9 % (FLUSH) 0.9 %
5-40 SYRINGE (ML) INJECTION EVERY 12 HOURS SCHEDULED
Status: DISCONTINUED | OUTPATIENT
Start: 2024-05-02 | End: 2024-05-03 | Stop reason: HOSPADM

## 2024-05-02 RX ORDER — LISINOPRIL 20 MG/1
20 TABLET ORAL 2 TIMES DAILY
Status: DISCONTINUED | OUTPATIENT
Start: 2024-05-02 | End: 2024-05-03 | Stop reason: HOSPADM

## 2024-05-02 RX ORDER — PHENYLEPHRINE HCL IN 0.9% NACL 1 MG/10 ML
SYRINGE (ML) INTRAVENOUS PRN
Status: DISCONTINUED | OUTPATIENT
Start: 2024-05-02 | End: 2024-05-02 | Stop reason: SDUPTHER

## 2024-05-02 RX ADMIN — Medication 100 MCG: at 09:10

## 2024-05-02 RX ADMIN — LIDOCAINE HYDROCHLORIDE 60 MG: 20 INJECTION, SOLUTION EPIDURAL; INFILTRATION; INTRACAUDAL; PERINEURAL at 07:27

## 2024-05-02 RX ADMIN — Medication 100 MCG: at 07:47

## 2024-05-02 RX ADMIN — ONDANSETRON 4 MG: 2 INJECTION INTRAMUSCULAR; INTRAVENOUS at 09:03

## 2024-05-02 RX ADMIN — Medication 100 MCG: at 07:39

## 2024-05-02 RX ADMIN — LISINOPRIL 20 MG: 20 TABLET ORAL at 23:19

## 2024-05-02 RX ADMIN — SUGAMMADEX 200 MG: 100 INJECTION, SOLUTION INTRAVENOUS at 09:25

## 2024-05-02 RX ADMIN — Medication 100 MCG: at 09:12

## 2024-05-02 RX ADMIN — Medication 100 MCG: at 08:30

## 2024-05-02 RX ADMIN — Medication 2000 MG: at 07:32

## 2024-05-02 RX ADMIN — PROPOFOL 90 MG: 10 INJECTION, EMULSION INTRAVENOUS at 07:27

## 2024-05-02 RX ADMIN — INSULIN LISPRO 1 UNITS: 100 INJECTION, SOLUTION INTRAVENOUS; SUBCUTANEOUS at 18:43

## 2024-05-02 RX ADMIN — ASPIRIN 81 MG: 81 TABLET, COATED ORAL at 14:13

## 2024-05-02 RX ADMIN — ROCURONIUM BROMIDE 50 MG: 10 SOLUTION INTRAVENOUS at 07:27

## 2024-05-02 RX ADMIN — TRANEXAMIC ACID 1000 MG: 100 INJECTION, SOLUTION INTRAVENOUS at 09:03

## 2024-05-02 RX ADMIN — FENTANYL CITRATE 25 MCG: 50 INJECTION INTRAMUSCULAR; INTRAVENOUS at 10:07

## 2024-05-02 RX ADMIN — ACETAMINOPHEN 650 MG: 325 TABLET ORAL at 23:19

## 2024-05-02 RX ADMIN — ACETAMINOPHEN 650 MG: 325 TABLET ORAL at 14:12

## 2024-05-02 RX ADMIN — TRANEXAMIC ACID 1000 MG: 100 INJECTION, SOLUTION INTRAVENOUS at 07:45

## 2024-05-02 RX ADMIN — DULOXETINE HYDROCHLORIDE 20 MG: 20 CAPSULE, DELAYED RELEASE ORAL at 23:30

## 2024-05-02 RX ADMIN — CEFAZOLIN 2000 MG: 10 INJECTION, POWDER, FOR SOLUTION INTRAVENOUS at 15:44

## 2024-05-02 RX ADMIN — Medication 100 MCG: at 07:41

## 2024-05-02 RX ADMIN — Medication 100 MCG: at 08:11

## 2024-05-02 RX ADMIN — CEFAZOLIN 2000 MG: 10 INJECTION, POWDER, FOR SOLUTION INTRAVENOUS at 23:22

## 2024-05-02 RX ADMIN — FLUTICASONE PROPIONATE 1 SPRAY: 50 SPRAY, METERED NASAL at 14:12

## 2024-05-02 RX ADMIN — METOPROLOL TARTRATE 25 MG: 25 TABLET, FILM COATED ORAL at 14:12

## 2024-05-02 RX ADMIN — CYCLOBENZAPRINE 10 MG: 10 TABLET, FILM COATED ORAL at 10:51

## 2024-05-02 RX ADMIN — Medication 200 MCG: at 08:54

## 2024-05-02 RX ADMIN — Medication 200 MCG: at 09:19

## 2024-05-02 RX ADMIN — SODIUM CHLORIDE, POTASSIUM CHLORIDE, SODIUM LACTATE AND CALCIUM CHLORIDE: 600; 310; 30; 20 INJECTION, SOLUTION INTRAVENOUS at 07:05

## 2024-05-02 RX ADMIN — ATORVASTATIN CALCIUM 10 MG: 10 TABLET, FILM COATED ORAL at 14:13

## 2024-05-02 RX ADMIN — METOPROLOL TARTRATE 25 MG: 25 TABLET, FILM COATED ORAL at 23:19

## 2024-05-02 RX ADMIN — DEXAMETHASONE SODIUM PHOSPHATE 10 MG: 10 INJECTION INTRAMUSCULAR; INTRAVENOUS at 07:48

## 2024-05-02 RX ADMIN — LISINOPRIL 20 MG: 20 TABLET ORAL at 14:12

## 2024-05-02 RX ADMIN — AMLODIPINE BESYLATE 5 MG: 5 TABLET ORAL at 23:19

## 2024-05-02 RX ADMIN — Medication 100 MCG: at 08:49

## 2024-05-02 RX ADMIN — FENTANYL CITRATE 50 MCG: 50 INJECTION INTRAMUSCULAR; INTRAVENOUS at 07:27

## 2024-05-02 RX ADMIN — TRAMADOL HYDROCHLORIDE 50 MG: 50 TABLET, COATED ORAL at 10:51

## 2024-05-02 RX ADMIN — Medication 100 MCG: at 08:26

## 2024-05-02 RX ADMIN — SODIUM CHLORIDE, PRESERVATIVE FREE 10 ML: 5 INJECTION INTRAVENOUS at 23:19

## 2024-05-02 ASSESSMENT — PAIN - FUNCTIONAL ASSESSMENT
PAIN_FUNCTIONAL_ASSESSMENT: FACE, LEGS, ACTIVITY, CRY, AND CONSOLABILITY (FLACC)
PAIN_FUNCTIONAL_ASSESSMENT: PREVENTS OR INTERFERES SOME ACTIVE ACTIVITIES AND ADLS

## 2024-05-02 ASSESSMENT — PAIN DESCRIPTION - DESCRIPTORS
DESCRIPTORS: SORE
DESCRIPTORS: ACHING;DISCOMFORT;SORE
DESCRIPTORS: ACHING;SORE
DESCRIPTORS: ACHING

## 2024-05-02 ASSESSMENT — PAIN DESCRIPTION - ORIENTATION
ORIENTATION: RIGHT

## 2024-05-02 ASSESSMENT — PAIN DESCRIPTION - PAIN TYPE: TYPE: CHRONIC PAIN

## 2024-05-02 ASSESSMENT — PAIN DESCRIPTION - LOCATION
LOCATION: SHOULDER
LOCATION: SHOULDER
LOCATION: SHOULDER;NECK
LOCATION: SHOULDER

## 2024-05-02 ASSESSMENT — PAIN DESCRIPTION - FREQUENCY: FREQUENCY: CONTINUOUS

## 2024-05-02 ASSESSMENT — PAIN SCALES - GENERAL
PAINLEVEL_OUTOF10: 5
PAINLEVEL_OUTOF10: 6
PAINLEVEL_OUTOF10: 2
PAINLEVEL_OUTOF10: 0
PAINLEVEL_OUTOF10: 3

## 2024-05-02 ASSESSMENT — LIFESTYLE VARIABLES: SMOKING_STATUS: 0

## 2024-05-02 NOTE — PROGRESS NOTES
Physical Therapy  Facility/Department: [facility] MED SURG  Physical Therapy Initial Assessment-day of surgery    Name: Dori Griffiths  : 1935  MRN: [id number]  Date of Service: 2024    Discharge Recommendations:  Pt currently functioning below baseline.  Recommend daily inpatient skilled therapy at time of discharge to maximize long term outcomes and prevent re-admission. Please refer to AM-PAC score for current level of function.     S/p rTSA by Dr. Randall 24      PT Equipment Recommendations  Equipment Needed: Yes (Pt. may need Jr. RW- assessing next session)      Patient Diagnosis(es): The encounter diagnosis was Acute postoperative pain.  Past Medical History:  has a past medical history of Anemia, Arthritis, DDD (degenerative disc disease), lumbar, Diabetes mellitus (HCC), Edema, Elevated serum creatinine, History of blood transfusion, Hyperlipidemia, Hypertension, Living in assisted living, Mobility impaired, Osteoporosis, Scoliosis, Shoulder arthritis, TIA (transient ischemic attack), and Walker as ambulation aid.  Past Surgical History:  has a past surgical history that includes Cataract removal; joint replacement (Bilateral); Femur fracture surgery (Left); Hip fracture surgery (Right); Cholecystectomy; Tonsillectomy; Arm Surgery (Right); hemicolectomy (Right, 10/04/2023); and Colonoscopy.    Assessment   Body Structures, Functions, Activity Limitations Requiring Skilled Therapeutic Intervention: Decreased functional mobility ;Decreased ROM;Decreased ADL status;Decreased strength;Decreased cognition;Decreased endurance;Decreased balance  Assessment: Pt. able to stand with PT/OT, but unable to fully gain balance as she started to urinate. NEEDS REASSESS next session to assess gait with approp. AD. Pt. almost fully dependent with all bed mob.  Specific Instructions for Next Treatment: REASSESS gait- pt. uses a rollator and will need bilat UE support to amb. Order written OK to use walker with surgical  dining room for all meals)  Ambulation Assistance: Independent (uses 4ww)  Transfer Assistance: Independent  Active : No  Leisure & Hobbies: activities at [facility]  Vision/Hearing  Hearing  Hearing: Within functional limits    Cognition   Orientation  Overall Orientation Status: Within Functional Limits  Cognition  Overall Cognitive Status: Exceptions  Following Commands: Follows multistep commands with repitition  Attention Span: Difficulty attending to directions  Safety Judgement: Decreased awareness of need for assistance;Decreased awareness of need for safety  Problem Solving: Assistance required to generate solutions;Assistance required to implement solutions;Decreased awareness of errors;Assistance required to correct errors made  Insights: Decreased awareness of deficits  Initiation: Requires cues for some  Sequencing: Requires cues for some  Cognition Comment: pt seen day of surgery. Groggy from anesthesia. Somewhat slurred speech, inc time to convey thoughts. Will continue to assess     Objective   Temp: 97.3 °F (36.3 °C)  Pulse: 77  Heart Rate Source: Monitor  Respirations: 16  SpO2: 94 %  O2 Device: None (Room air)  BP: (!) 128/55  MAP (Calculated): 79  BP Location: Left upper arm     Observation/Palpation  Posture: Fair  Observation: pt lying in bed. Son Tono present . Pt with sling and polar care applied. Pt is agreeable to session despite being somewhat lethargic/groggy. Focus on pain throughout session.  Gross Assessment  Sensation: Intact     AROM RLE (degrees)  RLE AROM: WFL  RLE General AROM: DF to neutral and knee flexion to 80 degrees (supine); hip WFL  (BILAT LE)              Bed mobility  Supine to Sit: Maximum assistance;2 Person assistance;Dependent/Total  Sit to Supine: Maximum assistance;2 Person assistance;Dependent/Total  Transfers  Sit to Stand: Moderate Assistance;2 Person Assistance;Maximum Assistance (Stood EOB using gait belt and HHA of Rt. Hand. Pt's feet too far

## 2024-05-02 NOTE — CONSULTS
[facility]  Office: [phone]  Joel Herrera DO, Palomo Obrien DO, Steve iMller DO, Ivan Tarango DO, Alcira Ballard MD, Lisset Arnold MD, Denise Lane MD, Annie Mcneil MD,  Chetan Vela MD, Shyam Key MD, Kunal Borges MD,  Mabel Aguilar DO, Sarah Ware MD, Isai Carballo MD, Brennen Herrera DO, Adali Sepulveda MD,  Malcolm Judge DO, Tessa Waldron MD, Harriet Ram MD, Joann Gutierrez MD, Marine Ewing MD,  Kike Kruse MD, Svetlana Butcher MD, Sheldon Tello MD, Daljit Conrad MD, Hector Bundy MD, Jennyfer Liz MD, Sam Mast DO, Bryce Arias DO, Cole Bridges MD,  Say Tran MD, Shirley Waterhouse, CNP,  aYel Reed CNP, Hong Villanueva, CNP,  Patricia Alvarez, DNP, Nancy Ruiz, CNP, Ketty Mcintosh, CNP, Kim Granados, CNP, Jennifer Cornejo, CNP, Ninfa Hodge, PA-C, Lisa Hernandez PA-C, Katarzyna Strickland, CNP, Esthela Cevallos, CNP, Arcadio Diaz, CNP, Caty Tucker, CNP, Lisa Rivera, CNP, Shyann Tracy, CNS, Dotty Lombardi, CNP, Arlin Chen CNP, Tracy Schwab, CNP         [facility]   IN-PATIENT SERVICE   [facility]    CONSULTATION / HISTORY AND PHYSICAL EXAMINATION            Date:   5/2/2024  Patient name:  Dori Griffiths  Date of admission:  5/2/2024  5:41 AM  MRN:   [id number]  Account:  [id number]  YOB: 1935  PCP:    Sam Weinstein MD  Room:   2007/2007-02  Code Status:    Full Code    Physician Requesting Consult: Jomar Randall MD    Reason for Consult:  medical management    Chief Complaint:     Reverse total shoulder    History Obtained From:     patient, electronic medical record    History of Present Illness:     This 89 yof presents with right TSR.  Postop she has some pain but it is tolerable.  Right arm in sling.  We have been asked to see her for medical conditions    Past Medical History:     Past Medical History:   Diagnosis Date    Anemia     iron    Arthritis     DDD (degenerative disc  disease), lumbar     Diabetes mellitus (HCC)     no medication    Edema     Pitting edema in her hands, feet and legs started begining of April 2024    Elevated serum creatinine     slightly elevated creatine of 1.2.  Dr Curtis her Oncologist noticed abnormal lab first and referred it back to PCP per patient Advocate Magui, , no Nephrologist    History of blood transfusion     Hyperlipidemia     Hypertension     sees Dr Cadet for cardiology    Living in assisted living     [facility]    Mobility impaired     pt has an gait instability, sees [facility] Dr Keating    Osteoporosis     Scoliosis     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    TIA (transient ischemic attack)     over 3 years ago from 2023. PCP told patient he is not sure if this is true or not and patient does not know for sure    Walker as ambulation aid         Past Surgical History:     Past Surgical History:   Procedure Laterality Date    ARM SURGERY Right     broken right arm, no hardware    CATARACT REMOVAL      CHOLECYSTECTOMY      COLONOSCOPY      FEMUR FRACTURE SURGERY Left     HEMICOLECTOMY Right 10/04/2023    OPEN RIGHT HEMICOLECTOMY WITH MOBILIZATION OF HEPATIC FLEXURE, LYSIS OF ADHESIONS, TAP BLOCK performed by Molina Carbone MD at [facility] OR    HIP FRACTURE SURGERY Right     JOINT REPLACEMENT Bilateral     knees    TONSILLECTOMY          Medications Prior to Admission:     Prior to Admission medications    Medication Sig Start Date End Date Taking? Authorizing Provider   acetaminophen (TYLENOL) 500 MG tablet Take 2 tablets by mouth 3 times daily as needed for Pain 5/2/24 5/16/24 Yes Jomar Randall MD   gabapentin (NEURONTIN) 100 MG capsule Take 1 capsule by mouth 3 times daily for 7 days. 5/2/24 5/9/24 Yes Jomar Randall MD   traMADol (ULTRAM) 50 MG tablet Take 1 tablet by mouth every 6 hours as needed for Pain for up to 7 days. 1-2 tablets PO q 6° prn pain Max Daily Amount: 200 mg 5/2/24 5/9/24 Yes Jomar Randall MD

## 2024-05-02 NOTE — ANESTHESIA POSTPROCEDURE EVALUATION
Department of Anesthesiology  Postprocedure Note    Patient: Dori Griffiths  MRN: [id number]  YOB: 1935  Date of evaluation: 5/2/2024    Procedure Summary       Date: 05/02/24 Room / Location: [address]    Anesthesia Start: 0721 Anesthesia Stop: 0943    Procedure: RIGHT SHOULDER TOTAL ARTHROPLASTY REVERSE (Right: Shoulder) Diagnosis:       Rotator cuff arthropathy, right      (Rotator cuff arthropathy, right [M12.811])    Surgeons: Jomar Randall MD Responsible Provider: Ashley Alonso MD    Anesthesia Type: general ASA Status: 4            Anesthesia Type: No value filed.    Lizzy Phase I: Lizzy Score: 10    Lizzy Phase II:      Anesthesia Post Evaluation    Patient location during evaluation: PACU  Patient participation: complete - patient participated  Level of consciousness: awake  Airway patency: patent  Nausea & Vomiting: no nausea  Cardiovascular status: blood pressure returned to baseline  Respiratory status: acceptable  Hydration status: euvolemic  Comments: Multimodal analgesia pain management as indicated by procedure  Multimodal analgesia pain management approach  Pain management: adequate    No notable events documented.

## 2024-05-02 NOTE — ANESTHESIA PRE PROCEDURE
Osteoporosis    • Scoliosis    • 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   • TIA (transient ischemic attack)     over 3 years ago from . PCP told patient he is not sure if this is true or not and patient does not know for sure   • Walker as ambulation aid        Past Surgical History:        Procedure Laterality Date   • ARM SURGERY Right     broken right arm, no hardware   • CATARACT REMOVAL     • CHOLECYSTECTOMY     • COLONOSCOPY     • FEMUR FRACTURE SURGERY Left    • HEMICOLECTOMY Right 10/04/2023    OPEN RIGHT HEMICOLECTOMY WITH MOBILIZATION OF HEPATIC FLEXURE, LYSIS OF ADHESIONS, TAP BLOCK performed by Molina Carbone MD at [facility]   • HIP FRACTURE SURGERY Right    • JOINT REPLACEMENT Bilateral     knees   • TONSILLECTOMY         Social History:    Social History     Tobacco Use   • Smoking status: Former     Current packs/day: 0.00     Types: Cigarettes     Quit date:      Years since quittin.3   • Smokeless tobacco: Never   Substance Use Topics   • Alcohol use: Not Currently                                Counseling given: Not Answered      Vital Signs (Current):   Vitals:    24 0617   BP: (!) 154/84   Pulse: 73   Resp: 20   Temp: 97.3 °F (36.3 °C)   SpO2: 98%   Weight: 64.9 kg (143 lb)   Height: 1.524 m (5')                                              BP Readings from Last 3 Encounters:   24 (!) 154/84   24 135/60   10/09/23 138/60       NPO Status: Time of last liquid consumption: 1700                        Time of last solid consumption: 1700                        Date of last liquid consumption: 24                        Date of last solid food consumption: 24    BMI:   Wt Readings from Last 3 Encounters:   24 64.9 kg (143 lb)   24 64.9 kg (143 lb)   10/04/23 56.7 kg (125 lb)     Body mass index is 27.93 kg/m².    CBC:   Lab Results   Component Value Date/Time    WBC 9.59 2024 11:52 AM    RBC 3.87 2024 11:52 AM    RBC 3.85 2024

## 2024-05-02 NOTE — OP NOTE
Operative Note      Patient: Dori Griffiths  YOB: 1935  MRN: [id number]    Date of Procedure: 5/2/2024    Pre-Op Diagnosis Codes:     * Rotator cuff arthropathy, right [M12.811]    Post-Op Diagnosis: Same       Procedure(s):  RIGHT SHOULDER TOTAL ARTHROPLASTY REVERSE    Surgeon(s):  Jomar Rnadall MD    Assistant:   Surgical Assistant: Macy Josue; Emilee Cummins  Resident: Sam Friedman DO    Anesthesia: General    Estimated Blood Loss (mL): less than 100     Complications: None    Specimens:   * No specimens in log *    Implants:  Implant Name Type Inv. Item Serial No.  Lot No. LRB No. Used Action   PIN FIX L60MM DIA3MM STD S STL THRD SGL SHRP FOR HUM RESECT - [id number]  PIN FIX L60MM DIA3MM STD S STL THRD SGL SHRP FOR HUM RESECT  [facility] [id number] Right 1 Implanted   BASEPLATE PIA [id number] MINI SHLDR REV TAPR COMPHSVE - [id number]  BASEPLATE PIA [id number] MINI SHLDR REV TAPR COMPHSVE  [facility] [id number] Right 1 Implanted   SPHERE PIA [id number] REG STD CO CHROM TAPR FIT FOR COMPHSVE - [id number]  SPHERE PIA [id number] REG STD CO CHROM TAPR FIT FOR COMPHSVE  [employer]- [id number] Right 1 Implanted   SCREW BNE L25MM DIA4.75MM HD DIA3.5MM CHAPINCITO FIX ANG - [id number]  SCREW BNE L25MM DIA4.75MM HD DIA3.5MM CHAPINCITO FIX ANG  [facility] [id number] Right 1 Implanted   SCREW BNE L30MM DIA6.5MM HEX HD DIA3.5MM FOR COMPHSVE CONV - [id number]  SCREW BNE L30MM DIA6.5MM HEX HD DIA3.5MM FOR COMPHSVE CONV  [employer]- [id number] Right 1 Implanted   SCREW BNE L15MM DIA4.75MM HD DIA3.5MM CHAPINCITO FIX ANG - [id number]  SCREW BNE L15MM DIA4.75MM HD DIA3.5MM CHAPINCITO FIX ANG  [facility] [id number] Right 1 Implanted   SCREW BNE L20MM DIA4.75MM HD DIA3.5MM CHAPINCITO FIX ANG - [id number]  SCREW BNE L20MM DIA4.75MM HD DIA3.5MM CHAPINCITO FIX ANG  [employer]- [id number] Right 1 Implanted   SCREW BNE L15MM DIA4.75MM HD DIA3.5MM  with a chlorhexidine soap and dried.  The shoulder was then prepared with a ChloraPrep solution after 3 minutes of drying time was draped in a sterile fashion.  An Ioban was used to cover up any exposed skin.  After the arm was prepped and draped a timeout was completed.  After timeout was completed an incision was created from the tip of the coracoid towards the deltoid insertion on the humerus.  Dissection was carried down to the level of the deltopectoral interval.  The cephalic vein was identified and retracted laterally.  The deltoid interval was opened up and entry into the subacromial space was obtained.  The Kobel retractor was then inserted underneath the pectoralis major and the deltoid muscle.  The conjoined tendon was identified and released laterally.  The conjoined tendon was then placed underneath a Kobel retractor and retracted medially.  The subscapularis was now exposed.  The upper border of the pectoralis major was also identified.  The upper 20% of the pec was released.  The biceps tendon was identified just medial to the pectoralis major attachment and biceps tenodesis was performed to the pectoralis major stump.  The proximal biceps was then released and the proximal portion of the biceps tendon was traveled up into the rotator interval.  The rotator interval was opened towards the base of the coracoid.  The biceps tendon was then cut intra-articularly and removed.  We then did a full subscap peel off of the lesser tuberosity.  #5 Ethibond suture was used to jarocho the subscap for later repair.  The arm board was then dropped and the shoulder was dislocated.  All osteophytes were removed from around the humeral head.  A Brown retractor was then placed posterior to the humerus and entry into the humeral canal was obtained with a canal finder.  Our entry point was just posterior to the bicipital groove at the apex of the humeral head.  Once into the humeral canal we were able to broach up to a size

## 2024-05-02 NOTE — PLAN OF CARE
Problem: Chronic Conditions and Co-morbidities  Goal: Patient's chronic conditions and co-morbidity symptoms are monitored and maintained or improved  Outcome: Progressing     Problem: Discharge Planning  Goal: Discharge to home or other facility with appropriate resources  Outcome: Progressing  Flowsheets  Taken 5/2/2024 1436 by An Beard, RN  Discharge to home or other facility with appropriate resources:   Identify barriers to discharge with patient and caregiver   Arrange for needed discharge resources and transportation as appropriate   Identify discharge learning needs (meds, wound care, etc)   Refer to discharge planning if patient needs post-hospital services based on physician order or complex needs related to functional status, cognitive ability or social support system    Problem: Safety - Adult  Goal: Free from fall injury  Outcome: Progressing     Problem: Pain  Goal: Verbalizes/displays adequate comfort level or baseline comfort level  Outcome: Progressing     Problem: ABCDS Injury Assessment  Goal: Absence of physical injury  Outcome: Progressing

## 2024-05-02 NOTE — H&P
History and Physical Update    Pt Name: Dori Griffiths  MRN: [id number]  YOB: 1935  Date of evaluation: 5/2/2024    [x] I have examined the patient and reviewed the H&P/Consult and there are no changes to the patient or plans.    [] I have examined the patient and reviewed the H&P/Consult and have noted the following changes:        Jomar Randall MD   Electronically signed 5/2/2024 at 7:05 AM

## 2024-05-02 NOTE — PROGRESS NOTES
Herbal and Nutritional Product Restrictions      The following herbal, alternative, and/or nutritional/dietary supplement product(s) has been discontinued per [facility] approved policy:    Co enzyme Q-10    Please reorder upon discharge if appropriate.    Thank you,  Dustin Cervantes RPH  5/2/2024 1:31 PM

## 2024-05-02 NOTE — DISCHARGE INSTR - COC
Continuity of Care Form    Patient Name: Dori Griffiths   :  1935  MRN:  [id number]    Admit date:  2024  Discharge date:  24    Code Status Order: Prior   Advance Directives:   Advance Care Flowsheet Documentation       Date/Time Healthcare Directive Type of Healthcare Directive Copy in Chart Healthcare Agent Appointed Healthcare Agent's Name Healthcare Agent's Phone Number    [phone] Yes, patient has an advance directive for healthcare treatment Health care treatment directive;Living will;Durable power of  for health care Yes, copy in chart -- Aquino [phone]            Admitting Physician:  Jomar Randall MD  PCP: Sam Weinstein MD    Discharging Nurse: An  Discharging Hospital Unit/Room#: [facility] OR Pool/NONE  Discharging Unit Phone Number: [phone]    Emergency Contact:   Extended Emergency Contact Information  Primary Emergency Contact: orlisa navasyce  Home Phone: [phone]  Relation: Patient Advocate  Secondary Emergency Contact: Miles Griffiths  Mobile Phone: [phone]  Relation: Child  Preferred language: English    Past Surgical History:  Past Surgical History:   Procedure Laterality Date    ARM SURGERY Right     broken right arm, no hardware    CATARACT REMOVAL      CHOLECYSTECTOMY      COLONOSCOPY      FEMUR FRACTURE SURGERY Left     HEMICOLECTOMY Right 10/04/2023    OPEN RIGHT HEMICOLECTOMY WITH MOBILIZATION OF HEPATIC FLEXURE, LYSIS OF ADHESIONS, TAP BLOCK performed by Molina Carbone MD at [facility] OR    HIP FRACTURE SURGERY Right     JOINT REPLACEMENT Bilateral     knees    TONSILLECTOMY         Immunization History:   Immunization History   Administered Date(s) Administered    COVID-19, PFIZER PURPLE top, DILUTE for use, (age 12 y+), 30mcg/0.3mL 2021, 2021       Active Problems:  Patient Active Problem List   Diagnosis Code    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    Rotator cuff arthropathy of right shoulder M12.811       Isolation/Infection:   Isolation            No  Isolation          Patient Infection Status       None to display            Nurse Assessment:  Last Vital Signs: /69   Pulse 72   Temp 96.8 °F (36 °C)   Resp 10   Ht 1.524 m (5')   Wt 64.9 kg (143 lb)   SpO2 98%   BMI 27.93 kg/m²     Last documented pain score (0-10 scale): Pain Level: 5  Last Weight:   Wt Readings from Last 1 Encounters:   05/02/24 64.9 kg (143 lb)     Mental Status:  oriented and alert    IV Access:  - None    Nursing Mobility/ADLs:  Walking   Assisted  Transfer  Assisted  Bathing  Assisted  Dressing  Assisted  Toileting  Assisted  Feeding  Assisted  Med Admin  Assisted  Med Delivery   { JEFF MED Delivery:[id number]}    Wound Care Documentation and Therapy:  Incision 05/02/24 Shoulder Right (Active)   Dressing Status Clean;Dry;Intact 05/02/24 1030   Dressing/Treatment Dry dressing;Other (comment) 05/02/24 1030   Closure Other (Comment) 05/02/24 1030   Incision Assessment Other (Comment) 05/02/24 1030   Drainage Amount None (dry) 05/02/24 1030   Odor None 05/02/24 1030   Mady-incision Assessment Intact 05/02/24 1030   Number of days: 0        Elimination:  Continence:   Bowel: Yes  Bladder: Yes  Urinary Catheter: None   Colostomy/Ileostomy/Ileal Conduit: No       Date of Last BM: 05/03    Intake/Output Summary (Last 24 hours) at 5/2/2024 1055  Last data filed at 5/2/2024 0938  Gross per 24 hour   Intake 600 ml   Output 100 ml   Net 500 ml     No intake/output data recorded.    Safety Concerns:     At Risk for Falls    Impairments/Disabilities:      None    Nutrition Therapy:  Current Nutrition Therapy:   - Oral Diet:  Carb Control 4 carbs/meal (1800kcals/day)    Routes of Feeding: Oral  Liquids: Thin Liquids  Daily Fluid Restriction: no  Last Modified Barium Swallow with Video (Video Swallowing Test): not done    Treatments at the Time of Hospital Discharge:   Respiratory Treatments: NA  Oxygen Therapy:  is not on home oxygen therapy.  Ventilator:    - No ventilator support    Rehab

## 2024-05-02 NOTE — FLOWSHEET NOTE
Pt is doing well. VS stable. Pain is well controlled and patient is resting comfortably. Patient meets PACU discharge criteria but no bed available at this time.   
normal...

## 2024-05-02 NOTE — PROGRESS NOTES
Occupational Therapy  Facility/Department: [facility] MED SURG  Occupational Therapy Initial Assessment    Name: Dori Griffiths  : 1935  MRN: [id number]  Date of Service: 2024    Discharge Recommendations:  Patient would benefit from continued therapy after discharge Pt currently functioning below baseline.  Recommend daily inpatient skilled therapy at time of discharge to maximize long term outcomes and prevent re-admission. Please refer to AM-PAC score for current level of function.       RN reports patient is medically stable for therapy treatment this date.    Chart reviewed prior to treatment and patient is agreeable for therapy.  All lines intact and patient positioned comfortably at end of treatment.  All patient needs addressed prior to ending therapy session.       Patient Diagnosis(es): The encounter diagnosis was Acute postoperative pain.  Past Medical History:  has a past medical history of Anemia, Arthritis, DDD (degenerative disc disease), lumbar, Diabetes mellitus (HCC), Edema, Elevated serum creatinine, History of blood transfusion, Hyperlipidemia, Hypertension, Living in assisted living, Mobility impaired, Osteoporosis, Scoliosis, Shoulder arthritis, TIA (transient ischemic attack), and Walker as ambulation aid.  Past Surgical History:  has a past surgical history that includes Cataract removal; joint replacement (Bilateral); Femur fracture surgery (Left); Hip fracture surgery (Right); Cholecystectomy; Tonsillectomy; Arm Surgery (Right); hemicolectomy (Right, 10/04/2023); and Colonoscopy.           Assessment   Performance deficits / Impairments: Decreased functional mobility ;Decreased ADL status;Decreased strength;Decreased ROM;Decreased safe awareness;Decreased balance;Decreased posture;Decreased cognition;Decreased endurance  Assessment: pt will need reassess in am to determine best AD and d/c recommendations. Pt currently requiring max A x 2 for all bed mob and standing.  Prognosis:

## 2024-05-03 VITALS
TEMPERATURE: 98.1 F | DIASTOLIC BLOOD PRESSURE: 76 MMHG | OXYGEN SATURATION: 97 % | HEIGHT: 60 IN | WEIGHT: 143 LBS | RESPIRATION RATE: 16 BRPM | SYSTOLIC BLOOD PRESSURE: 119 MMHG | BODY MASS INDEX: 28.07 KG/M2 | HEART RATE: 71 BPM

## 2024-05-03 LAB
GLUCOSE BLD-MCNC: 178 MG/DL (ref 65–105)
GLUCOSE BLD-MCNC: 179 MG/DL (ref 65–105)

## 2024-05-03 PROCEDURE — 99231 SBSQ HOSP IP/OBS SF/LOW 25: CPT | Performed by: INTERNAL MEDICINE

## 2024-05-03 PROCEDURE — 97110 THERAPEUTIC EXERCISES: CPT

## 2024-05-03 PROCEDURE — 82947 ASSAY GLUCOSE BLOOD QUANT: CPT

## 2024-05-03 PROCEDURE — 97535 SELF CARE MNGMENT TRAINING: CPT

## 2024-05-03 PROCEDURE — 6370000000 HC RX 637 (ALT 250 FOR IP): Performed by: ORTHOPAEDIC SURGERY

## 2024-05-03 PROCEDURE — 97116 GAIT TRAINING THERAPY: CPT

## 2024-05-03 PROCEDURE — 94761 N-INVAS EAR/PLS OXIMETRY MLT: CPT

## 2024-05-03 PROCEDURE — 97530 THERAPEUTIC ACTIVITIES: CPT

## 2024-05-03 PROCEDURE — 2580000003 HC RX 258: Performed by: ORTHOPAEDIC SURGERY

## 2024-05-03 RX ADMIN — Medication 400 MG: at 11:55

## 2024-05-03 RX ADMIN — LISINOPRIL 20 MG: 20 TABLET ORAL at 09:58

## 2024-05-03 RX ADMIN — TRAMADOL HYDROCHLORIDE 100 MG: 50 TABLET ORAL at 05:56

## 2024-05-03 RX ADMIN — ACETAMINOPHEN 650 MG: 325 TABLET ORAL at 05:56

## 2024-05-03 RX ADMIN — ACETAMINOPHEN 650 MG: 325 TABLET ORAL at 11:54

## 2024-05-03 RX ADMIN — FUROSEMIDE 40 MG: 40 TABLET ORAL at 09:58

## 2024-05-03 RX ADMIN — FLUTICASONE PROPIONATE 1 SPRAY: 50 SPRAY, METERED NASAL at 11:56

## 2024-05-03 RX ADMIN — ASPIRIN 81 MG: 81 TABLET, COATED ORAL at 09:58

## 2024-05-03 RX ADMIN — SODIUM CHLORIDE, PRESERVATIVE FREE 10 ML: 5 INJECTION INTRAVENOUS at 09:58

## 2024-05-03 RX ADMIN — BISACODYL 5 MG: 5 TABLET, COATED ORAL at 09:58

## 2024-05-03 RX ADMIN — ATORVASTATIN CALCIUM 10 MG: 10 TABLET, FILM COATED ORAL at 09:58

## 2024-05-03 RX ADMIN — METOPROLOL TARTRATE 25 MG: 25 TABLET, FILM COATED ORAL at 09:58

## 2024-05-03 RX ADMIN — TRAMADOL HYDROCHLORIDE 50 MG: 50 TABLET, COATED ORAL at 14:33

## 2024-05-03 ASSESSMENT — PAIN SCALES - GENERAL
PAINLEVEL_OUTOF10: 8
PAINLEVEL_OUTOF10: 6
PAINLEVEL_OUTOF10: 0
PAINLEVEL_OUTOF10: 5

## 2024-05-03 ASSESSMENT — PAIN DESCRIPTION - ORIENTATION
ORIENTATION: RIGHT
ORIENTATION: RIGHT

## 2024-05-03 ASSESSMENT — PAIN DESCRIPTION - LOCATION
LOCATION: SHOULDER

## 2024-05-03 ASSESSMENT — PAIN - FUNCTIONAL ASSESSMENT: PAIN_FUNCTIONAL_ASSESSMENT: PREVENTS OR INTERFERES SOME ACTIVE ACTIVITIES AND ADLS

## 2024-05-03 ASSESSMENT — PAIN DESCRIPTION - FREQUENCY: FREQUENCY: CONTINUOUS

## 2024-05-03 ASSESSMENT — PAIN DESCRIPTION - DESCRIPTORS
DESCRIPTORS: ACHING
DESCRIPTORS: ACHING;SHARP

## 2024-05-03 NOTE — PROGRESS NOTES
Occupational Therapy  Facility/Department: [facility] MED SURG  Occupational Therapy Re-Assessment    Name: Dori Griffiths  : 1935  MRN: [id number]  Date of Service: 5/3/2024    Discharge Recommendations:  Patient would benefit from continued therapy after discharge. Pt currently functioning below baseline.  Recommend daily inpatient skilled therapy at time of discharge to maximize long term outcomes and prevent re-admission. Please refer to AM-PAC score for current level of function.     OT Equipment Recommendations  Equipment Needed:  (Continue to assess)     CARLINE Nolan reports patient is medically stable for therapy treatment this date.    Chart reviewed prior to treatment and patient is agreeable for therapy.  All lines intact and patient positioned comfortably at end of treatment.  All patient needs addressed prior to ending therapy session.       Date of Procedure: 2024     Pre-Op Diagnosis Codes:     * Rotator cuff arthropathy, right [M12.811]     Post-Op Diagnosis: Same       Procedure(s):  RIGHT SHOULDER TOTAL ARTHROPLASTY REVERSE     Surgeon(s):  Jomar Randall MD    Patient Diagnosis(es): The encounter diagnosis was Acute postoperative pain.    Past Medical History:  has a past medical history of Anemia, Arthritis, DDD (degenerative disc disease), lumbar, Diabetes mellitus (HCC), Edema, Elevated serum creatinine, History of blood transfusion, Hyperlipidemia, Hypertension, Living in assisted living, Mobility impaired, Osteoporosis, Scoliosis, Shoulder arthritis, TIA (transient ischemic attack), and Walker as ambulation aid.    Past Surgical History:  has a past surgical history that includes Cataract removal; joint replacement (Bilateral); Femur fracture surgery (Left); Hip fracture surgery (Right); Cholecystectomy; Tonsillectomy; Arm Surgery (Right); hemicolectomy (Right, 10/04/2023); and Colonoscopy.    Assessment   Performance deficits / Impairments: Decreased functional mobility ;Decreased ADL  status;Decreased endurance;Decreased high-level IADLs;Decreased ROM;Decreased strength;Decreased balance;Decreased posture;Decreased safe awareness  Assessment: OT re-kenzie completed this AM. Patient presents with deficits in functional mobility and ADL performance secondary to decreased balance, decreased safety awareness, pain, decreased strength, and poor tolerance for functional activity. Patient continues to require 2 staff assist for bed mobility, functional mobility, and self care tasks. Skilled OT services indicated at this time to maximize this patient's safety and IND with self care tasks and to facilitate safe return to PLOF/home as able.  Prognosis: Good  Decision Making: High Complexity  REQUIRES OT FOLLOW-UP: Yes  Activity Tolerance  Activity Tolerance: Patient limited by pain;Patient limited by fatigue  Activity Tolerance Comments: Patient overall fair- tolerance for functional activity. Patient with c/o RUE pain throughout session entirety.        Plan   Occupational Therapy Plan  Times Per Week: 5-6x/week  Current Treatment Recommendations: Strengthening, Balance training, ROM, Functional mobility training, Endurance training, Equipment evaluation, education, & procurement, Patient/Caregiver education & training, Self-Care / ADL, Safety education & training, Positioning, Pain management, Modalities, Co-Treatment     Restrictions  Restrictions/Precautions  Restrictions/Precautions: Fall Risk, General Precautions, Bed Alarm, Surgical protocol  Position Activity Restriction  Other position/activity restrictions: Reverse TSA Dr. Randall- john for comfort, can use RW if needed. NO lifting more than 3# for 3 months    Subjective   General  Chart Reviewed: Yes  Patient assessed for rehabilitation services?: Yes  Additional Pertinent Hx: Diabetes, hypertension, chronic kidney disease  Family / Caregiver Present: No  Subjective  Subjective: Upon entry, patient was lying in bed with HOB elevated. Patient

## 2024-05-03 NOTE — PROGRESS NOTES
Physical Therapy  Facility/Department: [facility] MED SURG  Physical Therapy RE- Assessment    Name: Dori Griffiths  : 1935  MRN: [id number]  Date of Service: 5/3/2024    Discharge Recommendations:  Patient would benefit from continued therapy after discharge    Pt currently functioning below baseline.  Recommend daily inpatient skilled therapy at time of discharge to maximize long term outcomes and prevent re-admission. Please refer to AM-PAC score for current level of function.        Patient Diagnosis(es): The encounter diagnosis was Acute postoperative pain.  Past Medical History:  has a past medical history of Anemia, Arthritis, DDD (degenerative disc disease), lumbar, Diabetes mellitus (HCC), Edema, Elevated serum creatinine, History of blood transfusion, Hyperlipidemia, Hypertension, Living in assisted living, Mobility impaired, Osteoporosis, Scoliosis, Shoulder arthritis, TIA (transient ischemic attack), and Walker as ambulation aid.  Past Surgical History:  has a past surgical history that includes Cataract removal; joint replacement (Bilateral); Femur fracture surgery (Left); Hip fracture surgery (Right); Cholecystectomy; Tonsillectomy; Arm Surgery (Right); hemicolectomy (Right, 10/04/2023); and Colonoscopy.    Assessment   Assessment: RE-assess performed. Patient requires Mod A x2 for bed mobility and ambulation with roll walker. Patient is a HIGH fall risk due to decreased balance and shuffling gait. Recommend therapy following discharge.  Specific Instructions for Next Treatment: progress gait as able  Activity Tolerance  Activity Tolerance: Patient limited by pain;Patient limited by fatigue     Plan   Physical Therapy Plan  General Plan: 2 times a day 7 days a week  Specific Instructions for Next Treatment: progress gait as able  Current Treatment Recommendations: Strengthening, Balance training, Endurance training, Gait training, Transfer training, ROM, Safety education & training, Patient/Caregiver  education & training, Positioning, Therapeutic activities  Safety Devices  Type of Devices: Gait belt, Call light within reach, Chair alarm in place, Left in chair, Nurse notified, Patient at risk for falls, All fall risk precautions in place     Restrictions  Restrictions/Precautions  Restrictions/Precautions: Fall Risk, General Precautions, Bed Alarm, Surgical protocol  Position Activity Restriction  Other position/activity restrictions: Paulino Randall- slnina for comfort, can use RW if needed. NO lifting more than 3# for 3 months     Subjective   General  Chart Reviewed: Yes  Patient assessed for rehabilitation services?: Yes  Family / Caregiver Present: No  General Comment  Comments: RN states patient appropriate for therapy  Subjective  Subjective: patient resting in bed, agreeable to work with therapy         Social/Functional History  Social/Functional History  Lives With: Alone  Type of Home: Assisted living ([facility])  Home Layout: One level  Home Access: Level entry  Bathroom Shower/Tub: Walk-in shower  Bathroom Equipment: Shower chair, Grab bars in shower  Home Equipment: Walker, 4 wheeled, Cane, quad  Has the patient had two or more falls in the past year or any fall with injury in the past year?: No  Receives Help From: Other (comment) (staff at [facility])  ADL Assistance: Needs assistance (assist bathing/dressing daily)  Toileting: Independent  Homemaking Assistance: Needs assistance (goes to dining room for all meals)  Ambulation Assistance: Independent (uses 4ww)  Transfer Assistance: Independent  Active : No  Leisure & Hobbies: activities at [facility]  Vision/Hearing       Cognition   Cognition  Overall Cognitive Status: Exceptions  Following Commands: Follows one step commands with repetition  Attention Span: Attends with cues to redirect  Safety Judgement: Decreased awareness of need for assistance;Decreased awareness of need for safety  Problem Solving: Decreased awareness of

## 2024-05-03 NOTE — PROGRESS NOTES
Orthopedic Progress Note    Patient:  Dori Griffiths  YOB: 1935     89 y.o. female    Subjective:  Patient seen and examined, pain well controlled  No complaints or concerns  No issue overnight  Denies fever, CP, SOB    Vitals reviewed, afebrile    Objective:   Vitals:    05/03/24 0442   BP: 136/80   Pulse: 82   Resp: 12   Temp: 98.6 °F (37 °C)   SpO2: 92%     Gen: NAD, cooperative     Cardiovascular: regular rate, no dependent edema    Respiratory: No acute respiratory distress    RUE: Dressings c/d/I. Sling on. Compartments soft. 2+ rad pulse. Median/Radial/Ulnar/AIN/PIN motor intact. Median/Radial/Ulnar nerve SILT.     No results for input(s): \"WBC\", \"HGB\", \"HCT\", \"PLT\", \"INR\", \"NA\", \"K\", \"BUN\", \"CREATININE\", \"GLUCOSE\" in the last 72 hours.    Invalid input(s): \"PT\", \"PTT\"   See rec for complete list    Impression 89 y.o. female who is s/p     - Right reverse total shoulder arthroplasty, dos 5/2/2024    Plan    - Plan for DC today   - Weightbearing status: no pushing, pulling or lifting with RUE > 3 lbs   - Diet: adult general  - Strict ice and elevation for pain/swelling  - DVT ppx: Ok to start chemical AC on POD#1  - Pain control PO/IV Medication. Attempt to Wean IV medications.   - Encourage Incentive Spirometry use  - Work with PT/OT for mobilization  - Follow up with Dr. Randall in 10-14 days  - Please page ortho with any questions    Electronically signed by Sam Friedman DO 7:15 AM 5/3/2024

## 2024-05-03 NOTE — PROGRESS NOTES
[facility]  Office: [phone]  Joel Herrera DO, Palomo Obrien DO, Steve Miller DO, Ivan Tarango DO, Alcira Ballard MD, Lisset Arnold MD, Denise Lane MD, Annie Mcneil MD,  Chetan Vela MD, Shyam Key MD, Kunal Borges MD,  Mabel Aguilar DO, Sarah Ware MD, Isai Carballo MD, Brennen Herrera DO, Adali Sepulveda MD,  Malcolm Judge DO, Tessa Waldron MD, Harriet Ram MD, Joann Gutierrez MD, Marine Ewing MD,  Kike Kruse MD, Svetlana Butcher MD, Sheldon Tello MD, Daljit Conrad MD, Hector Bundy MD, Jennyfer Liz MD, Sam Mast DO, Bryce Arias DO, Cole Bridges MD,  Say Tran MD, Shirley Waterhouse, CNP,  Yael Reed CNP, Hong Villanueva, CNP,  Patricia Alvarez, DNP, Nancy Ruiz, CNP, Ketty Mcintosh, CNP, Kim Granados, CNP, Jennifer Cornejo, CNP, Ninfa Hodge, PA-C, Lisa Hernandez PA-C, Katarzyna Strickland, CNP, Esthela Cevallos, CNP, Arcadio Diaz, CNP, Caty Tucker, CNP, Lisa Rivera, CNP, Shyann Tracy, CNS, Dotty Lombardi, CNP, Arlin Chen CNP, Tracy Schwab, CNP         [facility]   IN-PATIENT SERVICE   [facility]    Progress Note    5/3/2024    10:44 AM    Name:   Dori Griffiths  MRN:     [id number]     Acct:      [id number]   Room:   2007/2007-02  IP Day:  0  Admit Date:  5/2/2024  5:41 AM    PCP:   Sam Weinstein MD  Code Status:  Full Code    Subjective:     C/C: shoulder surgery  Interval History Status: improved.     Feels a bit better today  Denies cp/sob/n/v        Review of Systems:     Constitutional:  negative for chills, fevers, sweats  Respiratory:  negative for cough, dyspnea on exertion, shortness of breath, wheezing  Cardiovascular:  negative for chest pain, chest pressure/discomfort, lower extremity edema, palpitations  Gastrointestinal:  negative for abdominal pain, constipation, diarrhea, nausea, vomiting  Neurological:  negative for dizziness, headache    Medications:     Allergies:  No Known

## 2024-05-03 NOTE — CARE COORDINATION
Spoke to Magui  from [facility] and pt will be returning to [facility] with current SN, PT/OT from staff at [facility].    Also has [facility] 8-10 hours/day, 7 days/week.

## 2024-05-03 NOTE — PROGRESS NOTES
Patient medicated with pain medication.Writer and pct assisted patient to bathroom via pilar steady. Patient able to stand without issue, but pilar steady used for safety. Patient urinated greater than 200 mls, and was assisted back to bed via pilar steady. Sling on for discomfort. Patient does not like the polar care because \"it makes me cold\" she agrees to bag of ice.

## 2024-05-03 NOTE — CARE COORDINATION
Social work spoke to family/advocate zurdo and rep for [facility] assisted living. Ok with both for pt to return there. [facility] will need to have PT/OT added and they can do that at assisted living. Patricia douglas    AVS sent to snf with PT/OT order on it. Patricia douglas

## 2024-05-03 NOTE — PLAN OF CARE
Problem: Chronic Conditions and Co-morbidities  Goal: Patient's chronic conditions and co-morbidity symptoms are monitored and maintained or improved  Outcome: Completed       Problem: Discharge Planning  Goal: Discharge to home or other facility with appropriate resources  5/3/2024 1456 by An Beard RN  Outcome: Completed         Problem: Safety - Adult  Goal: Free from fall injury  5/3/2024 1456 by An Beard RN  Outcome: Completed       Problem: Pain  Goal: Verbalizes/displays adequate comfort level or baseline comfort level  Outcome: Completed     Problem: ABCDS Injury Assessment  Goal: Absence of physical injury  Outcome: Completed

## 2024-05-03 NOTE — PROGRESS NOTES
Pt discharged to home in stable condition with belongings  Discharge instructions given  \"Meds To Beds\" medication at bedside  Pt denies having any further questions at this time  Locked up home medication and personal items given to patient at discharge  Patient/family state they have everything they were admitted with.  Hibiclens sent home with patient

## 2024-05-03 NOTE — PROGRESS NOTES
CLINICAL PHARMACY NOTE: MEDS TO BEDS    Total # of Prescriptions Filled: 1   The following medications were delivered to the patient:  Tramadol 50mg    Additional Documentation:        Family picked up the other 2 meds on 5/2/2024

## 2024-05-03 NOTE — PLAN OF CARE
Problem: Discharge Planning  Goal: Discharge to home or other facility with appropriate resources  5/3/2024 0428 by Brianna Sandra, RN  Outcome: Progressing  Flowsheets (Taken 5/3/2024 0428)  Discharge to home or other facility with appropriate resources:   Identify barriers to discharge with patient and caregiver   Arrange for needed discharge resources and transportation as appropriate   Identify discharge learning needs (meds, wound care, etc)   Arrange for interpreters to assist at discharge as needed   Refer to discharge planning if patient needs post-hospital services based on physician order or complex needs related to functional status, cognitive ability or social support system     Problem: Safety - Adult  Goal: Free from fall injury  5/3/2024 0428 by Brianna Sandra, RN  Outcome: Progressing  Flowsheets (Taken 5/3/2024 0428)  Free From Fall Injury: Instruct family/caregiver on patient safety

## 2024-05-03 NOTE — PROGRESS NOTES
SPIRITUAL CARE DEPARTMENT - [facility]  PROGRESS NOTE    Room # 2007/2007-02   Name: Dori Griffiths            Episcopalian: Amish     Reason for visit: RRT    I visited the family (Sister of the patient and grandchildren in the hallway).    Admit Date & Time: 5/2/2024  5:41 AM    Assessment:  Dori Griffiths is a 89 y.o. female in the hospital because Rotator cuff arthropathy of right shoulder. Upon entering the room the interdisciplinary teams were at work in making sure that the patient was alert. and well. The  monitored the situation, and prayed at the door.      Intervention:  I introduced myself and my title as  I offered space for the family of the patient (who were down the patiño)  to express feelings, needs, and concerns and provided a ministry presence. The  went down the patiño to check on the extended family who were awaiting news on the update of their loved ones health status. The  gave an assurance to the family that the medical team was working on their loved one, and someone from the medical team would be out shortly to address their loved ones health status with them. A brief conversation was held with the grandson concerning soccer, and baseball that lightened the mood. The  gave the family encouraging words. The  did inform a nurse concerning the extended family awaiting an update, and the nurse conveyed the message. The patients  came out to give the extended family an update. The  checked with the nurse concerning if there was anything else she could do, before leaving the area.      Outcome:  The family was thankful for the Chaplains encouragement. The grand kids (a pre-teen/teen girl, and boy) especially thanked the  for her kindness as they passed by the Spiritual Services office later on.      Plan:  Chaplains will remain available to offer spiritual and emotional support as needed.    Electronically signed by Ade  Chaplain Raf, on 5/3/2024 at 3:52 PM.  Spiritual Care Department  [facility]

## 2024-08-19 ENCOUNTER — HOSPITAL ENCOUNTER (OUTPATIENT)
Age: 89
Setting detail: SPECIMEN
Discharge: HOME | End: 2024-08-19

## 2024-08-19 PROCEDURE — 87086 URINE CULTURE/COLONY COUNT: CPT

## 2024-08-19 PROCEDURE — 81003 URINALYSIS AUTO W/O SCOPE: CPT

## 2024-08-20 LAB
BILIRUB UR QL STRIP: NEGATIVE
CLARITY UR: CLEAR
COLOR UR: YELLOW
COMMENT: NORMAL
GLUCOSE UR STRIP-MCNC: NEGATIVE MG/DL
HGB UR QL STRIP.AUTO: NEGATIVE
KETONES UR STRIP-MCNC: NEGATIVE MG/DL
LEUKOCYTE ESTERASE UR QL STRIP: NEGATIVE
NITRITE UR QL STRIP: NEGATIVE
PH UR STRIP: 5 [PH] (ref 5–8)
PROT UR STRIP-MCNC: NEGATIVE MG/DL
SP GR UR STRIP: 1.01 (ref 1–1.03)
UROBILINOGEN UR STRIP-ACNC: NORMAL EU/DL (ref 0–1)

## 2024-08-21 LAB
MICROORGANISM SPEC CULT: NORMAL
SERVICE CMNT-IMP: NORMAL
SPECIMEN DESCRIPTION: NORMAL

## (undated) DEVICE — WOUND RETRACTOR AND PROTECTOR: Brand: ALEXIS O WOUND PROTECTOR-RETRACTOR

## (undated) DEVICE — BLADE ES L6IN ELASTOMERIC COAT EXT DURABLE BEND UPTO 90DEG

## (undated) DEVICE — 450 ML BOTTLE OF 0.05% CHLORHEXIDINE GLUCONATE IN 99.95% STERILE WATER FOR IRRIGATION, USP AND APPLICATOR.: Brand: IRRISEPT ANTIMICROBIAL WOUND LAVAGE

## (undated) DEVICE — BIT DRL DIA2.7MM PERIPH SCR REUSE FOR COMPHSVE REV SHLDR

## (undated) DEVICE — STRYKER PERFORMANCE SERIES SAGITTAL BLADE: Brand: STRYKER PERFORMANCE SERIES

## (undated) DEVICE — NEPTUNE E-SEP 165MM SUCTION SLEEVE: Brand: NEPTUNE E-SEP

## (undated) DEVICE — SURGICAL PROCEDURE KIT BASIN MAJ SET UP

## (undated) DEVICE — RELOAD STPL L75MM OPN H3.8MM CLS 1.5MM WIRE DIA0.2MM REG

## (undated) DEVICE — Z DISCONTINUED USE 2859069 SUTURE VICRYL 0 L27IN ABSRB OS-6 BRAID COAT UD J534H

## (undated) DEVICE — SUTURE VICRYL SZ 2-0 L36IN ABSRB UD L36MM CT-1 1/2 CIR J945H

## (undated) DEVICE — GLOVE SURG SZ 8 CRM LTX FREE POLYISOPRENE POLYMER BEAD ANTI

## (undated) DEVICE — SEALER ENDOSCP NANO COAT OPN DIV CRV L JAW LIGASURE IMPACT

## (undated) DEVICE — GLOVE SURG SZ 75 CRM LTX FREE POLYISOPRENE POLYMER BEAD ANTI

## (undated) DEVICE — Device

## (undated) DEVICE — SUTURE VCRL SZ 3-0 L27IN ABSRB UD L26MM SH 1/2 CIR J416H

## (undated) DEVICE — SUTURE ETHIBOND EXCEL SZ 5 L30IN NONABSORBABLE GRN L48MM CCS MB47G

## (undated) DEVICE — Z DISCONTINUED USE 2220143 SUTURE VCRL SZ 2-0 L27IN ABSRB UD L36MM CP-1 1/2 CIR REV J266H

## (undated) DEVICE — SUTURE PDS II SZ 3-0 L27IN ABSRB CLR SH L26MM 1/2 CIR TAPR Z416H

## (undated) DEVICE — BLANKET WRM W29.9XL79.1IN UP BODY FORC AIR MISTRAL-AIR

## (undated) DEVICE — STRIP,CLOSURE,WOUND,MEDI-STRIP,1/2X4: Brand: MEDLINE

## (undated) DEVICE — SUTURE MONOCRYL SZ 3-0 L27IN ABSRB UD L24MM PS-1 3/8 CIR PRIM Y936H

## (undated) DEVICE — PACK PROCEDURE SURG FACILITY SPEC GI BWL SPT SVMMC

## (undated) DEVICE — SPONGE LAP W18XL18IN WHT COT 4 PLY FLD STRUNG RADPQ DISP ST 2 PER PACK

## (undated) DEVICE — COVER,MAYO STAND,XL,STERILE: Brand: MEDLINE

## (undated) DEVICE — SOLUTION IRRIG 3000ML 0.9% SOD CHL USP UROMATIC PLAS CONT

## (undated) DEVICE — DRESSING HYDROFIBER AQUACEL AG ADVANTAGE 3.5X10 IN

## (undated) DEVICE — GAUZE,SPONGE,FLUFF,6"X6.75",STRL,5/TRAY: Brand: MEDLINE

## (undated) DEVICE — SUTURE MONOCRYL STRATAFIX SPRL + SZ 2-0 L18IN ABSRB UD CT-1 SXMP1B413

## (undated) DEVICE — YANKAUER,POOLE TIP,STERILE,50/CS: Brand: MEDLINE

## (undated) DEVICE — BLANKET WRM W40.2XL55.9IN IORT LO BODY + MISTRAL AIR

## (undated) DEVICE — GAUZE,SPONGE,4"X4",16PLY,XRAY,STRL,LF: Brand: MEDLINE

## (undated) DEVICE — SOLUTION PREP PAINT POV IOD FOR SKIN MUCOUS MEM

## (undated) DEVICE — SPONGE,PEANUT,XRAY,ST,SM,3/8",5/CARD: Brand: MEDLINE INDUSTRIES, INC.

## (undated) DEVICE — SUTURE SUTTAPE L40IN DIA1.3MM NONABSORBABLE WHT BLU L26.5MM AR7500

## (undated) DEVICE — GLOVE SURG SZ 8 L12IN FNGR THK79MIL GRN LTX FREE

## (undated) DEVICE — SUTURE PROL SZ 1 L30IN NONABSORBABLE BLU L36MM CT-1 1/2 CIR 8425H

## (undated) DEVICE — COVER LT HNDL BLU PLAS

## (undated) DEVICE — COUNTER NDL 40 COUNT HLD 70 FOAM BLK ADH W/ MAG

## (undated) DEVICE — CEMENT MIXING SYSTEM WITH FEMORAL BREAKWAY NOZZLE: Brand: REVOLUTION

## (undated) DEVICE — SHEET, ORTHO, SPLIT, STERILE: Brand: MEDLINE

## (undated) DEVICE — SUTURE VCRL + SZ 3-0 L27IN ABSRB UD L26MM SH 1/2 CIR VCP416H

## (undated) DEVICE — DECANTER BAG 9": Brand: MEDLINE INDUSTRIES, INC.

## (undated) DEVICE — DRAPE,U/ SHT,SPLIT,PLAS,STERIL: Brand: MEDLINE

## (undated) DEVICE — STAPLER INT L75MM CUT LN L73MM STPL LN L77MM BLU B FRM 8

## (undated) DEVICE — GARMENT,MEDLINE,DVT,INT,CALF,MED, GEN2: Brand: MEDLINE

## (undated) DEVICE — YANKAUER,FLEXIBLE HANDLE,REGLR CAPACITY: Brand: MEDLINE INDUSTRIES, INC.

## (undated) DEVICE — SKIN PREP TRAY W/CHG: Brand: MEDLINE INDUSTRIES, INC.

## (undated) DEVICE — DRAIN SURG W10XL20CM SIL SMOOTH FLAT 3/4 PERF DBL WRP

## (undated) DEVICE — TOWEL,OR,DSP,ST,BLUE,DLX,XR,4/PK,20PK/CS: Brand: MEDLINE

## (undated) DEVICE — SOLUTION IRRIG 1000ML 0.9% SOD CHL USP POUR PLAS BTL

## (undated) DEVICE — GOWN,SIRUS,NON REINFRCD,LARGE,SET IN SL: Brand: MEDLINE

## (undated) DEVICE — SUTURE PROL SZ 4-0 L30IN NONABSORBABLE BLU SH L26MM 1/2 CIR 8831H

## (undated) DEVICE — SUTURE FIBERWIRE SZ 2 W/ TAPERED NEEDLE BLUE L38IN NONABSORB BLU L26.5MM 1/2 CIRCLE AR7200

## (undated) DEVICE — PAD,ABDOMINAL,5"X9",ST,LF,25/BX: Brand: MEDLINE INDUSTRIES, INC.

## (undated) DEVICE — SOLUTION ANSEP 3% PEROXIDE 8OZ TOP NS LF

## (undated) DEVICE — STAPLER INT L60MM REG TISS BLU B FRM 8 FIRING 2 ROW AUTO

## (undated) DEVICE — SUTURE VCRL SZ 0 L54IN ABSRB UD LIGAPAK REEL NDL J287G

## (undated) DEVICE — DRAPE,LAP,CHOLE,W/TROUGHS,STERILE: Brand: MEDLINE

## (undated) DEVICE — RELOAD STPL L55MM OPN H3.8MM CLS H1.5MM WIRE DIA0.2MM REG

## (undated) DEVICE — 4-PORT MANIFOLD: Brand: NEPTUNE 2

## (undated) DEVICE — PROTECTOR EYE PT SELF ADH NS OPT GRD LF

## (undated) DEVICE — 1LYRTR 16FR10ML 100%SILI SNAP: Brand: MEDLINE INDUSTRIES, INC.

## (undated) DEVICE — POSITIONER HD W8XH4XL8.5IN RASPBERRY FOAM SLT